# Patient Record
Sex: FEMALE | Race: WHITE | NOT HISPANIC OR LATINO | Employment: STUDENT | URBAN - METROPOLITAN AREA
[De-identification: names, ages, dates, MRNs, and addresses within clinical notes are randomized per-mention and may not be internally consistent; named-entity substitution may affect disease eponyms.]

---

## 2018-08-30 ENCOUNTER — TELEPHONE (OUTPATIENT)
Dept: OBSTETRICS AND GYNECOLOGY | Facility: CLINIC | Age: 23
End: 2018-08-30

## 2018-08-30 RX ORDER — NORETHINDRONE ACETATE AND ETHINYL ESTRADIOL 1MG-20(21)
1 KIT ORAL DAILY
Qty: 28 TABLET | Refills: 11 | Status: CANCELLED | OUTPATIENT
Start: 2018-08-30 | End: 2019-08-30

## 2018-08-30 NOTE — TELEPHONE ENCOUNTER
Khoi pt- states she just noticed a large lump on the left side of her labia minora. States it's sore, but is closed. Would like to be seen.  last seen on 8/23/16    Scheduled with Kus[facility] on 9/5

## 2018-08-30 NOTE — TELEPHONE ENCOUNTER
Khoi pt-- pt's mother calling, states that the pt would like to come in to be seen. States that she has a painful cyst on her vagina. Pt also would like a refill of her b.c. Pt's # [phone]

## 2018-08-30 NOTE — TELEPHONE ENCOUNTER
Khoi pt - pt was last seen on 8/23/16. Pt has a Bartholin's Cyst on the outside of her vagina and she would like to come in for an appt.

## 2018-09-03 ENCOUNTER — NURSE TRIAGE (OUTPATIENT)
Dept: ADMINISTRATIVE | Facility: CLINIC | Age: 23
End: 2018-09-03

## 2018-09-05 ENCOUNTER — OFFICE VISIT (OUTPATIENT)
Dept: OBSTETRICS AND GYNECOLOGY | Facility: CLINIC | Age: 23
End: 2018-09-05
Payer: COMMERCIAL

## 2018-09-05 VITALS
DIASTOLIC BLOOD PRESSURE: 70 MMHG | SYSTOLIC BLOOD PRESSURE: 110 MMHG | HEIGHT: 65 IN | BODY MASS INDEX: 24.68 KG/M2 | WEIGHT: 148.13 LBS

## 2018-09-05 DIAGNOSIS — N75.0 CYST OF LEFT BARTHOLIN'S GLAND: Primary | ICD-10-CM

## 2018-09-05 PROCEDURE — 56420 I&D BARTHOLINS GLAND ABSCESS: CPT | Mod: S$GLB,,, | Performed by: NURSE PRACTITIONER

## 2018-09-05 PROCEDURE — 87070 CULTURE OTHR SPECIMN AEROBIC: CPT

## 2018-09-05 PROCEDURE — 99215 OFFICE O/P EST HI 40 MIN: CPT | Mod: 25,S$GLB,, | Performed by: NURSE PRACTITIONER

## 2018-09-05 PROCEDURE — 87076 CULTURE ANAEROBE IDENT EACH: CPT

## 2018-09-05 PROCEDURE — 87075 CULTR BACTERIA EXCEPT BLOOD: CPT

## 2018-09-05 PROCEDURE — 3008F BODY MASS INDEX DOCD: CPT | Mod: CPTII,S$GLB,, | Performed by: NURSE PRACTITIONER

## 2018-09-05 PROCEDURE — 99999 PR PBB SHADOW E&M-EST. PATIENT-LVL III: CPT | Mod: PBBFAC,,, | Performed by: NURSE PRACTITIONER

## 2018-09-05 RX ORDER — LISDEXAMFETAMINE DIMESYLATE 20 MG/1
20 CAPSULE ORAL EVERY MORNING
Refills: 0 | COMMUNITY
Start: 2018-08-31 | End: 2020-11-10

## 2018-09-05 RX ORDER — SULFAMETHOXAZOLE AND TRIMETHOPRIM 800; 160 MG/1; MG/1
1 TABLET ORAL 2 TIMES DAILY
Qty: 14 TABLET | Refills: 0 | Status: SHIPPED | OUTPATIENT
Start: 2018-09-05 | End: 2018-09-12

## 2018-09-05 RX ORDER — IBUPROFEN 800 MG/1
800 TABLET ORAL EVERY 8 HOURS PRN
Qty: 20 TABLET | Refills: 0 | Status: SHIPPED | OUTPATIENT
Start: 2018-09-05 | End: 2018-09-18

## 2018-09-05 NOTE — PROGRESS NOTES
Chief Complaint: Problem:     Chief Complaint   Patient presents with    Bartholin's Cyst       Dr Westfall  no pap in computer        (Dr. Westfall patient)    Last Pap:  No result found Patient states she had pap done one year ago when she lived in [place], per Dr. John Diehl, and it was nml.      HPI:      Lorena Daniel is a 23 y.o.  who presents today for c/o possible Left Bartholin gland cyst that has been there for a little less than a week.    LMP: Patient's last menstrual period was 2018 (exact date).    Ms. Daniel is currently sexually active with a single male partner.   She declines STD screening today with her examination.      Past Medical History:   Diagnosis Date    ADHD (attention deficit hyperactivity disorder)     Depression      Past Surgical History:   Procedure Laterality Date    KNEE ARTHROSCOPY      STOMACH SURGERY  2016    gastric sleeve     Social History     Socioeconomic History    Marital status: Single     Spouse name: Not on file    Number of children: Not on file    Years of education: Not on file    Highest education level: Not on file   Social Needs    Financial resource strain: Not on file    Food insecurity - worry: Not on file    Food insecurity - inability: Not on file    Transportation needs - medical: Not on file    Transportation needs - non-medical: Not on file   Occupational History    Not on file   Tobacco Use    Smoking status: Current Some Day Smoker    Smokeless tobacco: Never Used   Substance and Sexual Activity    Alcohol use: Yes     Comment: socially    Drug use: No    Sexual activity: Yes     Birth control/protection: Condom   Other Topics Concern    Not on file   Social History Narrative    Not on file     Family History   Problem Relation Age of Onset    Breast cancer Neg Hx     Colon cancer Neg Hx     Ovarian cancer Neg Hx      OB History      Para Term  AB Living    0 0 0 0 0 0    SAB TAB  "Ectopic Multiple Live Births    0 0 0 0            ROS:     GENERAL: Denies unintentional weight gain or weight loss.    CARDIOVASCULAR: Denies palpitations or chest pain.   RESPIRATORY: Denies shortness of breath or dyspnea on exertion.  ABDOMEN: Denies abdominal pain, constipation, diarrhea, nausea, vomiting, change in appetite.  URINARY: Denies frequency, dysuria, hematuria.  NEUROLOGIC: Denies syncope or weakness.   VULVAR: REPORTS pain and lump to left labia majora near vaginal opening; no lesions and no itching.  VAGINAL: No relaxation, no itching, no abnormal bleeding and no lesions.    Physical Exam:      PHYSICAL EXAM:  /70   Ht 5' 5" (1.651 m)   Wt 67.2 kg (148 lb 2.4 oz)   LMP 08/11/2018 (Exact Date)   BMI 24.65 kg/m²   Body mass index is 24.65 kg/m².     APPEARANCE: Well nourished, well developed, in no acute distress.  PSYCH: Appropriate mood and affect.  CHEST: Normal respiratory effort.  VULVAR: Patient presents with c/o painful vulvar mass.  No fever.  She denies drainage from mass.  There is a 4 cm abscess of the left labium majus.  There is not surrounding cellulitis.  Normal hair distribution.  Adequate perineal body and normal urethral meatus.  Vagina moist and well-rugated.    =======================================  Bartholin Gland Cyst I&D Counseling:  The patient was informed of the risk of bleeding, pain and infection.  She was counseled on outpatient management, and she agrees to proceed with in-office incision and drainage.    Procedure Note: (Procedure performed per Dr. Tobin)  A time out was performed.  Bartholins gland prepped with betadine and the base of the abscess was injected with 6cc 1% lidocaine with epinephrine.  A scalpel was used to incise over pointing aspect of abscess with a #11 blade, and a small hemostat was used to widen the incision and allow for more adequate drainage.  Purulent material drained.  A Word catheter was placed and inflated.  The patient " tolerated the procedure well.  Cultures were taken.    Post I&D of Bartholin's gland cyst drainage Counseling:  The patient was counseled to manage post I&D pain with NSAID's.  She was advised to expect a bloody yellowish discharge for 24-48 hours.  If a word catheter was placed, the patient should expect it to possibly fall out sometime within the next 2 weeks.  She was advised to take warm sitz baths twice daily.  The patient was advised to avoid vaginal intercourse, douching, and tampons for at least 2 week.  She was counseled to call for evaluation in the event of heavy bleeding, fever greater than 101.0F, or worsening pain and redness at the I&D site.  Counseling lasted about 15 minutes, and all questions were answered.    Follow-up:  RTC for follow-up in 2 weeks.  Pending culture/biopsy results.  * Will call with results when finalized.    * Use of the [facility] Patient Portal discussed and encouraged during today's visit.     Assessment/Plan:     Cyst of left Bartholin's gland  -     Aerobic culture  -     Culture, Anaerobic        -     Incision & drainage of bartholin's gland cyst        -     Bactrim rx sent (x 7 days        -     Motrin 800 mg PO Q8h prn pain rx sent    Counseling:     Patient was counseled today on current ASCCP pap guidelines, the recommendation for yearly pelvic exams, healthy diet and exercise routines, annual mammograms and breast self awareness. She is to see her PCP for other health maintenance.

## 2018-09-08 LAB — BACTERIA SPEC AEROBE CULT: NORMAL

## 2018-09-11 LAB — BACTERIA SPEC ANAEROBE CULT: NORMAL

## 2018-09-12 ENCOUNTER — TELEPHONE (OUTPATIENT)
Dept: OBSTETRICS AND GYNECOLOGY | Facility: CLINIC | Age: 23
End: 2018-09-12

## 2018-09-12 NOTE — PROGRESS NOTES
Aayush Carrillo,   I tried calling you on the mobile number listed in your chart and it went straight to an automated voicemail, so that's why I didn't leave a message.  The number I have for your cell is [phone].  I was calling to tell you that the culture came back from your Bartholin's cyst, and I reviewed them with , and she agrees that the Bactrim (antibiotic) we gave you should clear up any infection.  I wanted to know how you are doing, and if you feel you are improving a little more each day.  So, if you have a moment to message me back or call me, please do.    ~ AMY Hopson

## 2018-09-14 ENCOUNTER — TELEPHONE (OUTPATIENT)
Dept: OBSTETRICS AND GYNECOLOGY | Facility: CLINIC | Age: 23
End: 2018-09-14

## 2018-09-18 ENCOUNTER — OFFICE VISIT (OUTPATIENT)
Dept: OBSTETRICS AND GYNECOLOGY | Facility: CLINIC | Age: 23
End: 2018-09-18
Attending: OBSTETRICS & GYNECOLOGY
Payer: COMMERCIAL

## 2018-09-18 VITALS
BODY MASS INDEX: 25.33 KG/M2 | WEIGHT: 152 LBS | DIASTOLIC BLOOD PRESSURE: 68 MMHG | HEIGHT: 65 IN | SYSTOLIC BLOOD PRESSURE: 110 MMHG

## 2018-09-18 DIAGNOSIS — N75.0 CYST OF LEFT BARTHOLIN'S GLAND: Primary | ICD-10-CM

## 2018-09-18 DIAGNOSIS — Z30.430 ENCOUNTER FOR INSERTION OF MIRENA IUD: ICD-10-CM

## 2018-09-18 DIAGNOSIS — Z11.3 SCREEN FOR STD (SEXUALLY TRANSMITTED DISEASE): ICD-10-CM

## 2018-09-18 PROCEDURE — 99999 PR PBB SHADOW E&M-EST. PATIENT-LVL III: CPT | Mod: PBBFAC,,, | Performed by: OBSTETRICS & GYNECOLOGY

## 2018-09-18 PROCEDURE — 3008F BODY MASS INDEX DOCD: CPT | Mod: CPTII,S$GLB,, | Performed by: OBSTETRICS & GYNECOLOGY

## 2018-09-18 PROCEDURE — 87491 CHLMYD TRACH DNA AMP PROBE: CPT

## 2018-09-18 PROCEDURE — 99213 OFFICE O/P EST LOW 20 MIN: CPT | Mod: S$GLB,,, | Performed by: OBSTETRICS & GYNECOLOGY

## 2018-09-18 RX ORDER — DESVENLAFAXINE SUCCINATE 50 MG/1
TABLET, EXTENDED RELEASE ORAL
COMMUNITY
Start: 2018-07-28 | End: 2019-01-08

## 2018-09-18 NOTE — PROGRESS NOTES
Subjective:       Patient ID: Lorena Daniel is a 23 y.o. female.    Chief Complaint:  Bartholin's Cyst (follow up )      History of Present Illness  - patient presents to f/u Bartholin's cyst. Reports that it has completely resolved; the catheter fell out a few days after it was placed. She completed her course of antibiotics.  - patient would like to start something for birth control. She is having regular periods and using condoms. She's concerned that she won't remember to take a daily pill. Patient would like know more about an IUD.    Past Medical History:   Diagnosis Date    ADHD (attention deficit hyperactivity disorder)     Depression        Past Surgical History:   Procedure Laterality Date    ARTHROSCOPY, KNEE Left 2014    [facility]    CHONDRECTOMY, KNEE, SEMILUNAR CARTILAGE Left 2014    [facility]    EXCISION, PLICA, KNEE, ARTHROSCOPIC Left 2014    [facility]    KNEE ARTHROSCOPY      RECONSTRUCTION, LIGAMENT Left 2014    [facility]    STOMACH SURGERY  2016    gastric sleeve         Current Outpatient Medications:     desvenlafaxine succinate (PRISTIQ) 50 MG Tb24, , Disp: , Rfl:     VYVANSE 20 mg capsule, Take 20 mg by mouth every morning., Disp: , Rfl: 0    levonorgestrel (MIRENA) 20 mcg/24 hr (5 years) IUD, 1 Intra Uterine Device by Intrauterine route once. for 1 dose, Disp: 1 each, Rfl: 0    Review of patient's allergies indicates:  No Known Allergies    GYN & OB History  Patient's last menstrual period was 2018.   Date of Last Pap: No result found    OB History    Para Term  AB Living   0 0 0 0 0 0   SAB TAB Ectopic Multiple Live Births   0 0 0 0               Social History     Socioeconomic History    Marital status: Single     Spouse name: Not on file    Number of children: Not on file    Years of education: Not on  "file    Highest education level: Not on file   Social Needs    Financial resource strain: Not on file    Food insecurity - worry: Not on file    Food insecurity - inability: Not on file    Transportation needs - medical: Not on file    Transportation needs - non-medical: Not on file   Occupational History    Not on file   Tobacco Use    Smoking status: Former Smoker    Smokeless tobacco: Never Used   Substance and Sexual Activity    Alcohol use: Yes     Comment: socially    Drug use: No    Sexual activity: Yes     Birth control/protection: Condom   Other Topics Concern    Not on file   Social History Narrative    Not on file       Family History   Problem Relation Age of Onset    Breast cancer Neg Hx     Colon cancer Neg Hx     Ovarian cancer Neg Hx        Review of Systems  Review of Systems   All other systems reviewed and are negative.       Objective:     Vitals:    09/18/18 1106   BP: 110/68   Weight: 68.9 kg (152 lb 0.1 oz)   Height: 5' 5" (1.651 m)       Physical Exam:   Constitutional: She appears well-developed and well-nourished. She is cooperative. No distress.             Abdominal: Soft. Normal appearance. There is no tenderness.     Genitourinary: Vagina normal and uterus normal. There is no rash, tenderness or lesion on the right labia. There is no rash, tenderness or lesion on the left labia. Cervix is normal. Right adnexum displays no mass, no tenderness and no fullness. Left adnexum displays no mass, no tenderness and no fullness.   Genitourinary Comments: No evidence of Bartholin's cyst. GC/CT culture done.               Neurological: She is alert.          Assessment/ Plan:     Orders Placed This Encounter    C. trachomatis/N. gonorrhoeae by AMP DNA [facility]; Cervix    levonorgestrel (MIRENA) 20 mcg/24 hr (5 years) IUD       Lorena was seen today for bartholin's cyst.    Diagnoses and all orders for this visit:    Cyst of left Bartholin's gland    Screen for STD (sexually " transmitted disease)  -     C. trachomatis/N. gonorrhoeae by AMP DNA [facility]    Encounter for insertion of mirena IUD  -     levonorgestrel (MIRENA) 20 mcg/24 hr (5 years) IUD; 1 Intra Uterine Device by Intrauterine route once. for 1 dose    - reviewed chart: patient didn't return for test of cure (chlamydia): culture done today  - reviewed risks/benefits of long acting contraception. Patient opts for Mirena. Will insert under u/s guidance. Gave pamphlet.    Follow-up in about 1 month (around 10/18/2018) for IUD insertion.

## 2018-09-20 LAB
C TRACH DNA SPEC QL NAA+PROBE: NOT DETECTED
N GONORRHOEA DNA SPEC QL NAA+PROBE: NOT DETECTED

## 2018-10-01 ENCOUNTER — TELEPHONE (OUTPATIENT)
Dept: PHARMACY | Facility: CLINIC | Age: 23
End: 2018-10-01

## 2018-10-02 NOTE — TELEPHONE ENCOUNTER
Spoke with pt and she would like Mirena delivered to [address] [place] [address]

## 2018-10-05 ENCOUNTER — TELEPHONE (OUTPATIENT)
Dept: OBSTETRICS AND GYNECOLOGY | Facility: CLINIC | Age: 23
End: 2018-10-05

## 2018-10-05 DIAGNOSIS — Z30.430 ENCOUNTER FOR INSERTION OF MIRENA IUD: Primary | ICD-10-CM

## 2018-10-23 ENCOUNTER — TELEPHONE (OUTPATIENT)
Dept: OBSTETRICS AND GYNECOLOGY | Facility: CLINIC | Age: 23
End: 2018-10-23

## 2018-10-30 ENCOUNTER — TELEPHONE (OUTPATIENT)
Dept: OBSTETRICS AND GYNECOLOGY | Facility: CLINIC | Age: 23
End: 2018-10-30

## 2018-10-30 NOTE — TELEPHONE ENCOUNTER
Dr. Westfall patient calling- forgot to call for Iud insertion  And  just started her period, please call pt back

## 2018-11-27 ENCOUNTER — PROCEDURE VISIT (OUTPATIENT)
Dept: OBSTETRICS AND GYNECOLOGY | Facility: CLINIC | Age: 23
End: 2018-11-27
Attending: OBSTETRICS & GYNECOLOGY
Payer: COMMERCIAL

## 2018-11-27 VITALS
WEIGHT: 154.19 LBS | SYSTOLIC BLOOD PRESSURE: 118 MMHG | DIASTOLIC BLOOD PRESSURE: 70 MMHG | BODY MASS INDEX: 25.69 KG/M2 | HEIGHT: 65 IN

## 2018-11-27 DIAGNOSIS — Z01.812 PRE-PROCEDURE LAB EXAM: ICD-10-CM

## 2018-11-27 DIAGNOSIS — Z30.430 ENCOUNTER FOR INSERTION OF MIRENA IUD: Primary | ICD-10-CM

## 2018-11-27 LAB
B-HCG UR QL: NEGATIVE
CTP QC/QA: YES

## 2018-11-27 PROCEDURE — 76857 US EXAM PELVIC LIMITED: CPT | Mod: S$GLB,,, | Performed by: OBSTETRICS & GYNECOLOGY

## 2018-11-27 PROCEDURE — 58300 INSERT INTRAUTERINE DEVICE: CPT | Mod: S$GLB,,, | Performed by: OBSTETRICS & GYNECOLOGY

## 2018-11-27 PROCEDURE — 81025 URINE PREGNANCY TEST: CPT | Mod: S$GLB,,, | Performed by: OBSTETRICS & GYNECOLOGY

## 2018-11-27 NOTE — PROCEDURES
Risks/benefits discussed and consents signed. Time out performed.    Speculum inserted. Cervix cleaned with Betadine. Under ultrasound guidance, uterus sounded to 6.5 cm. Mirena fitted to sound depth. Under ultrasound guidance, Mirena inserted without difficulty. Tolerated well. Strings trimmed to 3 -4 cm from external os. Excellent hemostasis noted. Normal Bimanual exam.    Ultrasound confirms proper placement of Mirena.    Patient will follow up in 4 weeks to assess Mirena strings.

## 2019-01-07 ENCOUNTER — TELEPHONE (OUTPATIENT)
Dept: OBSTETRICS AND GYNECOLOGY | Facility: CLINIC | Age: 24
End: 2019-01-07

## 2019-01-07 NOTE — TELEPHONE ENCOUNTER
Dr Westfall pt's mom Holly calling regarding the pt having really bad vaginal bleeding and cramps with a iud. Mom states that she needs appt after 3:00 and this is an emergency. States also there have been phone calls regarding this matter and no one can get the pt in.I notice appt for tomorrow 1-8-19 @ 2:00 is sched. Holly # [phone]

## 2019-01-07 NOTE — TELEPHONE ENCOUNTER
Spoke with Mother since pt is in class.  Pt has been bleeding and cramping since IUD insertion.  She's song and has gained 10 pounds.  Asking if she can get an appt after 3pm, someone told her we dont have appts after 3pm.  She has an appt tomorrow at 2:00, rescheduled to 3:45.

## 2019-01-08 ENCOUNTER — OFFICE VISIT (OUTPATIENT)
Dept: OBSTETRICS AND GYNECOLOGY | Facility: CLINIC | Age: 24
End: 2019-01-08
Attending: OBSTETRICS & GYNECOLOGY
Payer: COMMERCIAL

## 2019-01-08 VITALS
SYSTOLIC BLOOD PRESSURE: 110 MMHG | WEIGHT: 152.88 LBS | HEIGHT: 65 IN | DIASTOLIC BLOOD PRESSURE: 66 MMHG | BODY MASS INDEX: 25.47 KG/M2

## 2019-01-08 DIAGNOSIS — R35.0 URINARY FREQUENCY: ICD-10-CM

## 2019-01-08 DIAGNOSIS — Z30.431 IUD CHECK UP: Primary | ICD-10-CM

## 2019-01-08 LAB
BILIRUB SERPL-MCNC: ABNORMAL MG/DL
BLOOD URINE, POC: 250
COLOR, POC UA: ABNORMAL
GLUCOSE UR QL STRIP: ABNORMAL
KETONES UR QL STRIP: ABNORMAL
LEUKOCYTE ESTERASE URINE, POC: ABNORMAL
NITRITE, POC UA: ABNORMAL
PH, POC UA: 5
PROTEIN, POC: ABNORMAL
SPECIFIC GRAVITY, POC UA: 1.02
UROBILINOGEN, POC UA: ABNORMAL

## 2019-01-08 PROCEDURE — 99999 PR PBB SHADOW E&M-EST. PATIENT-LVL III: CPT | Mod: PBBFAC,,, | Performed by: OBSTETRICS & GYNECOLOGY

## 2019-01-08 PROCEDURE — 81002 POCT URINE DIPSTICK WITHOUT MICROSCOPE: ICD-10-PCS | Mod: S$GLB,,, | Performed by: OBSTETRICS & GYNECOLOGY

## 2019-01-08 PROCEDURE — 87086 URINE CULTURE/COLONY COUNT: CPT

## 2019-01-08 PROCEDURE — 99213 PR OFFICE/OUTPT VISIT, EST, LEVL III, 20-29 MIN: ICD-10-PCS | Mod: 25,S$GLB,, | Performed by: OBSTETRICS & GYNECOLOGY

## 2019-01-08 PROCEDURE — 99213 OFFICE O/P EST LOW 20 MIN: CPT | Mod: 25,S$GLB,, | Performed by: OBSTETRICS & GYNECOLOGY

## 2019-01-08 PROCEDURE — 87186 SC STD MICRODIL/AGAR DIL: CPT

## 2019-01-08 PROCEDURE — 87088 URINE BACTERIA CULTURE: CPT

## 2019-01-08 PROCEDURE — 99999 PR PBB SHADOW E&M-EST. PATIENT-LVL III: ICD-10-PCS | Mod: PBBFAC,,, | Performed by: OBSTETRICS & GYNECOLOGY

## 2019-01-08 PROCEDURE — 81002 URINALYSIS NONAUTO W/O SCOPE: CPT | Mod: S$GLB,,, | Performed by: OBSTETRICS & GYNECOLOGY

## 2019-01-08 PROCEDURE — 3008F PR BODY MASS INDEX (BMI) DOCUMENTED: ICD-10-PCS | Mod: CPTII,S$GLB,, | Performed by: OBSTETRICS & GYNECOLOGY

## 2019-01-08 PROCEDURE — 87077 CULTURE AEROBIC IDENTIFY: CPT

## 2019-01-08 PROCEDURE — 3008F BODY MASS INDEX DOCD: CPT | Mod: CPTII,S$GLB,, | Performed by: OBSTETRICS & GYNECOLOGY

## 2019-01-08 RX ORDER — ESTRADIOL 1 MG/1
1 TABLET ORAL DAILY
Qty: 7 TABLET | Refills: 0 | Status: SHIPPED | OUTPATIENT
Start: 2019-01-08 | End: 2020-11-10

## 2019-01-08 NOTE — PROGRESS NOTES
Subjective:       Patient ID: Lorena Daniel is a 23 y.o. female.    Chief Complaint:  IUD Check (cramping and still bleeding)      History of Present Illness  - patient presents for IUD check. Has had spotting and yellow discharge since insertion (not daily but fairly often). Reports a fishy vaginal odor that's new as well. Has had some cramping since insertion that is random and different than her usual period cramps (those are usually in her back; these were in back and front). Also has been experiencing urinary frequency.  - has felt song lately. Recently stopped Pristiq. Has f/u scheduled with her psychiatrist to discuss.    Past Medical History:   Diagnosis Date    ADHD (attention deficit hyperactivity disorder)     Depression        Past Surgical History:   Procedure Laterality Date    ARTHROSCOPY, KNEE Left 2014    [facility]    CHONDRECTOMY, KNEE, SEMILUNAR CARTILAGE Left 2014    [facility]    EXCISION, PLICA, KNEE, ARTHROSCOPIC Left 2014    [facility]    KNEE ARTHROSCOPY      RECONSTRUCTION, LIGAMENT Left 2014    [facility]    STOMACH SURGERY  2016    gastric sleeve         Current Outpatient Medications:     levonorgestrel (MIRENA) 20 mcg/24 hr (5 years) IUD, Insert 1 device by  intrauterine route once., Disp: 1 each, Rfl: 0    VYVANSE 20 mg capsule, Take 20 mg by mouth every morning., Disp: , Rfl: 0    estradiol (ESTRACE) 1 MG tablet, Take 1 tablet (1 mg total) by mouth once daily., Disp: 7 tablet, Rfl: 0    metroNIDAZOLE (NUVESSA) 1.3 % Gel, Place 1 application vaginally once. Bin#[id number] Pcn#CN Grp#ECNUVESSA ID#NUVESSA for 1 dose, Disp: 5 g, Rfl: 0    Review of patient's allergies indicates:  No Known Allergies    GYN & OB History  No LMP recorded. Patient has had an implant.   Date of Last Pap: No result found    OB History    Para  "Term  AB Living   0 0 0 0 0 0   SAB TAB Ectopic Multiple Live Births   0 0 0 0               Social History     Socioeconomic History    Marital status: Single     Spouse name: Not on file    Number of children: Not on file    Years of education: Not on file    Highest education level: Not on file   Social Needs    Financial resource strain: Not on file    Food insecurity - worry: Not on file    Food insecurity - inability: Not on file    Transportation needs - medical: Not on file    Transportation needs - non-medical: Not on file   Occupational History    Not on file   Tobacco Use    Smoking status: Former Smoker    Smokeless tobacco: Never Used   Substance and Sexual Activity    Alcohol use: Yes     Comment: socially    Drug use: No    Sexual activity: Yes     Birth control/protection: IUD     Comment: mirena inserted 2018   Other Topics Concern    Not on file   Social History Narrative    Not on file       Family History   Problem Relation Age of Onset    Breast cancer Neg Hx     Colon cancer Neg Hx     Ovarian cancer Neg Hx        Review of Systems  Review of Systems   All other systems reviewed and are negative.       Objective:     Vitals:    19 1610   BP: 110/66   Weight: 69.3 kg (152 lb 14.2 oz)   Height: 5' 5" (1.651 m)       Physical Exam:   Constitutional: She appears well-developed and well-nourished. She is cooperative. No distress.             Abdominal: Soft. Normal appearance. There is no tenderness.     Genitourinary: Uterus normal. There is no rash, tenderness or lesion on the right labia. There is no rash, tenderness or lesion on the left labia. Cervix is normal. Right adnexum displays no mass, no tenderness and no fullness. Left adnexum displays no mass, no tenderness and no fullness. Vaginal discharge found. Additional cervical findings: IUD strings visualized  Genitourinary Comments: Thin yellow discharge in vault.               Neurological: She is alert.   "        Assessment/ Plan:     Orders Placed This Encounter    Urine culture    POCT URINE DIPSTICK WITHOUT MICROSCOPE    estradiol (ESTRACE) 1 MG tablet    metroNIDAZOLE (NUVESSA) 1.3 % Gel       Lorena was seen today for iud check.    Diagnoses and all orders for this visit:    IUD check up    Urinary frequency  -     POCT URINE DIPSTICK WITHOUT MICROSCOPE  -     Urine culture    Other orders  -     estradiol (ESTRACE) 1 MG tablet; Take 1 tablet (1 mg total) by mouth once daily.  -     metroNIDAZOLE (NUVESSA) 1.3 % Gel; Place 1 application vaginally once. Bin#[id number] Pcn#CN Grp#ECNUVESSA ID#NUVESSA for 1 dose    - will give a one time dose of Nuvessa for presumptive bacterial vaginosis.  - will give seven days of estradiol to stabilize uterine lining.  - send urine for culture. Will wait to treat until resulted.  - patient will keep me posted.    Follow-up if symptoms worsen or fail to improve.

## 2019-01-11 ENCOUNTER — PATIENT MESSAGE (OUTPATIENT)
Dept: OBSTETRICS AND GYNECOLOGY | Facility: CLINIC | Age: 24
End: 2019-01-11

## 2019-01-11 LAB — BACTERIA UR CULT: NORMAL

## 2019-01-11 RX ORDER — NITROFURANTOIN 25; 75 MG/1; MG/1
100 CAPSULE ORAL 2 TIMES DAILY
Qty: 14 CAPSULE | Refills: 0 | Status: SHIPPED | OUTPATIENT
Start: 2019-01-11 | End: 2020-10-26

## 2019-01-11 NOTE — TELEPHONE ENCOUNTER
Please call patient to be sure she received my portal message that she has a UTI. I sent in Macrobid. Thanks.

## 2019-04-09 ENCOUNTER — TELEPHONE (OUTPATIENT)
Dept: OBSTETRICS AND GYNECOLOGY | Facility: CLINIC | Age: 24
End: 2019-04-09

## 2019-04-09 NOTE — TELEPHONE ENCOUNTER
Khoi pt - pt had an iud inserted on 11/27/18 and has had her period for 2 weeks. She would like to see if she should come in for an appt to check if the iud moved.

## 2019-07-18 NOTE — TELEPHONE ENCOUNTER
Reason for Disposition   [1] SEVERE pain AND [2] not improved 2 hours after pain medicine    Protocols used: ST VULVAR SYMPTOMS-A-AH    Mother calling regarding Pt having a cyst to vaginal area.  Pt rates pain 9/10.  Care advice given.  Mother wants earlier appt than 9/5/2018.  Will message MD and Staff.   4

## 2019-10-02 ENCOUNTER — TELEPHONE (OUTPATIENT)
Dept: OBSTETRICS AND GYNECOLOGY | Facility: CLINIC | Age: 24
End: 2019-10-02

## 2019-10-02 NOTE — TELEPHONE ENCOUNTER
Dr. Westfall pt called saying that she had a cyst drained but now there is a lump there. Pt said it does not hurt but is concerned. Pt also said that she would like a full panel of labs drawn because she thinks her hormones are messed. Please advise.

## 2019-10-02 NOTE — TELEPHONE ENCOUNTER
"Pt states she has a lump where a Bartholin cyst was drained.  Denies pain and erythema.  Advised if its not bothering her she can watch it as it may be scar tissue.  She wants labs drawn, says her hormones are out of whack and wants a general health panel.  Advised Dr. Westfall doesn't normally check "wellness labs" but she can discuss at annual.  Scheduled annual with Dr. Westfall 10/22.   "

## 2019-10-22 ENCOUNTER — OFFICE VISIT (OUTPATIENT)
Dept: OBSTETRICS AND GYNECOLOGY | Facility: CLINIC | Age: 24
End: 2019-10-22
Attending: OBSTETRICS & GYNECOLOGY
Payer: COMMERCIAL

## 2019-10-22 VITALS
HEIGHT: 65 IN | BODY MASS INDEX: 26.41 KG/M2 | WEIGHT: 158.5 LBS | SYSTOLIC BLOOD PRESSURE: 118 MMHG | DIASTOLIC BLOOD PRESSURE: 72 MMHG

## 2019-10-22 DIAGNOSIS — Z12.4 ENCOUNTER FOR SCREENING FOR CERVICAL CANCER: ICD-10-CM

## 2019-10-22 DIAGNOSIS — Z01.419 ENCOUNTER FOR GYNECOLOGICAL EXAMINATION: Primary | ICD-10-CM

## 2019-10-22 PROCEDURE — 88142 CYTOPATH C/V THIN LAYER: CPT

## 2019-10-22 PROCEDURE — 99999 PR PBB SHADOW E&M-EST. PATIENT-LVL III: ICD-10-PCS | Mod: PBBFAC,,, | Performed by: OBSTETRICS & GYNECOLOGY

## 2019-10-22 PROCEDURE — 99999 PR PBB SHADOW E&M-EST. PATIENT-LVL III: CPT | Mod: PBBFAC,,, | Performed by: OBSTETRICS & GYNECOLOGY

## 2019-10-22 PROCEDURE — 99395 PREV VISIT EST AGE 18-39: CPT | Mod: S$GLB,,, | Performed by: OBSTETRICS & GYNECOLOGY

## 2019-10-22 PROCEDURE — 99395 PR PREVENTIVE VISIT,EST,18-39: ICD-10-PCS | Mod: S$GLB,,, | Performed by: OBSTETRICS & GYNECOLOGY

## 2019-10-22 RX ORDER — VILAZODONE HYDROCHLORIDE 20 MG/1
TABLET ORAL
COMMUNITY
Start: 2019-10-17 | End: 2023-06-20

## 2019-10-22 RX ORDER — HYDROCORTISONE 25 MG/G
OINTMENT TOPICAL
COMMUNITY
Start: 2019-10-08 | End: 2020-10-26

## 2019-10-22 RX ORDER — NYSTATIN 100000 U/G
OINTMENT TOPICAL
Refills: 0 | COMMUNITY
Start: 2019-10-08 | End: 2020-10-26

## 2019-10-22 NOTE — PROGRESS NOTES
Subjective:       Patient ID: Lorena Daniel is a 24 y.o. female.    Chief Complaint:  Well Woman (last pap wnl per pt in [place] 2017, declines STD testing ) and Hot Flashes (c/o hot flashes, increased hunger, increase breast size)      History of Present Illness  - here for annual. Left Bartholin's cyst is still present but not bothersome. Has been feeling more hungry lately and has gained a few pounds. Has had some hot flashes. Is amenorrheic with Mirena. Declines cultures; no new partners.    Past Medical History:   Diagnosis Date    ADHD (attention deficit hyperactivity disorder)     Depression        Past Surgical History:   Procedure Laterality Date    KNEE ARTHROSCOPY      STOMACH SURGERY  2016    gastric sleeve         Current Outpatient Medications:     hydrocortisone 2.5 % ointment, , Disp: , Rfl:     nystatin (MYCOSTATIN) ointment, MIX WITH EQUAL PARTS HYDROCORTISONE AND APPLY TO AFFECTED AREA BID FOR 7 TO 14 DAYS, Disp: , Rfl: 0    VIIBRYD 20 mg Tab, , Disp: , Rfl:     estradiol (ESTRACE) 1 MG tablet, Take 1 tablet (1 mg total) by mouth once daily. (Patient not taking: Reported on 10/22/2019), Disp: 7 tablet, Rfl: 0    levonorgestrel (MIRENA) 20 mcg/24 hr (5 years) IUD, Insert 1 device by  intrauterine route once., Disp: 1 each, Rfl: 0    nitrofurantoin, macrocrystal-monohydrate, (MACROBID) 100 MG capsule, Take 1 capsule (100 mg total) by mouth 2 (two) times daily. (Patient not taking: Reported on 10/22/2019), Disp: 14 capsule, Rfl: 0    VYVANSE 20 mg capsule, Take 20 mg by mouth every morning., Disp: , Rfl: 0    Review of patient's allergies indicates:  No Known Allergies    GYN & OB History  Patient's last menstrual period was 10/15/2019 (approximate).   Date of Last Pap: No result found    OB History    Para Term  AB Living   0 0 0 0 0 0   SAB TAB Ectopic Multiple Live Births   0 0 0 0         Social History     Socioeconomic History    Marital status: Single  "    Spouse name: Not on file    Number of children: Not on file    Years of education: Not on file    Highest education level: Not on file   Occupational History    Not on file   Social Needs    Financial resource strain: Not on file    Food insecurity:     Worry: Not on file     Inability: Not on file    Transportation needs:     Medical: Not on file     Non-medical: Not on file   Tobacco Use    Smoking status: Former Smoker    Smokeless tobacco: Never Used   Substance and Sexual Activity    Alcohol use: Yes     Comment: socially    Drug use: No    Sexual activity: Yes     Birth control/protection: IUD     Comment: mirena inserted 11/2018   Lifestyle    Physical activity:     Days per week: Not on file     Minutes per session: Not on file    Stress: Not on file   Relationships    Social connections:     Talks on phone: Not on file     Gets together: Not on file     Attends [employer]an service: Not on file     Active member of club or organization: Not on file     Attends meetings of clubs or organizations: Not on file     Relationship status: Not on file   Other Topics Concern    Not on file   Social History Narrative    Not on file       Family History   Problem Relation Age of Onset    Breast cancer Neg Hx     Colon cancer Neg Hx     Ovarian cancer Neg Hx        Review of Systems  Review of Systems   Respiratory: Negative for shortness of breath.    Cardiovascular: Negative for chest pain and palpitations.   Gastrointestinal: Negative for blood in stool, nausea and vomiting.   Genitourinary:        - see HPI   Skin: Negative for rash and wound.   Allergic/Immunologic: Negative for immunocompromised state.   Neurological: Negative for dizziness and syncope.   Hematological: Negative for adenopathy.   Psychiatric/Behavioral: Negative for behavioral problems.        Objective:     Vitals:    10/22/19 1037   BP: 118/72   Weight: 71.9 kg (158 lb 8.2 oz)   Height: 5' 5" (1.651 m)       Physical Exam: "   Constitutional: She is oriented to person, place, and time. She appears well-developed and well-nourished.        Pulmonary/Chest: Right breast exhibits no mass, no nipple discharge, no skin change, no tenderness and no swelling. Left breast exhibits no mass, no nipple discharge, no skin change, no tenderness and no swelling. Breasts are symmetrical.        Abdominal: Soft. She exhibits no distension. There is no tenderness.     Genitourinary: Vagina normal and uterus normal. There is no tenderness or lesion on the right labia. There is no tenderness or lesion on the left labia. Cervix is normal. Right adnexum displays no mass, no tenderness and no fullness. Left adnexum displays no mass, no tenderness and no fullness. No vaginal discharge found. Additional cervical findings: pap smear done  Genitourinary Comments: 1 cm Bartholin's cyst on the left, not infected, discharge easily expressed.            Musculoskeletal: Moves all extremeties.       Neurological: She is alert and oriented to person, place, and time.     Psychiatric: She has a normal mood and affect.        Assessment/ Plan:     Orders Placed This Encounter    Liquid-based pap smear, screening       Lorena was seen today for well woman and hot flashes.    Diagnoses and all orders for this visit:    Encounter for gynecological examination    Encounter for screening for cervical cancer   -     Liquid-based pap smear, screening    - recommend warm compresses and sitz baths for left Bartholin's cyst. Advised patient to call me if becomes painful or larger.  - reassured that she will still cycle with Mirena even though she doesn't have periods, which can explain the cyclic hot flashes.  - recommend she see PCP for health maintenance.    -     Follow up in about 1 year (around 10/22/2020) for annual exam.

## 2020-10-26 ENCOUNTER — OFFICE VISIT (OUTPATIENT)
Dept: OBSTETRICS AND GYNECOLOGY | Facility: CLINIC | Age: 25
End: 2020-10-26
Attending: OBSTETRICS & GYNECOLOGY
Payer: COMMERCIAL

## 2020-10-26 VITALS
HEIGHT: 65 IN | SYSTOLIC BLOOD PRESSURE: 122 MMHG | DIASTOLIC BLOOD PRESSURE: 84 MMHG | BODY MASS INDEX: 28.93 KG/M2 | WEIGHT: 173.63 LBS

## 2020-10-26 DIAGNOSIS — R14.0 ABDOMINAL BLOATING: ICD-10-CM

## 2020-10-26 DIAGNOSIS — Z12.4 ENCOUNTER FOR SCREENING FOR CERVICAL CANCER: ICD-10-CM

## 2020-10-26 DIAGNOSIS — Z01.419 ENCOUNTER FOR GYNECOLOGICAL EXAMINATION: Primary | ICD-10-CM

## 2020-10-26 PROCEDURE — 99395 PREV VISIT EST AGE 18-39: CPT | Mod: S$GLB,,, | Performed by: OBSTETRICS & GYNECOLOGY

## 2020-10-26 PROCEDURE — 99999 PR PBB SHADOW E&M-EST. PATIENT-LVL III: ICD-10-PCS | Mod: PBBFAC,,, | Performed by: OBSTETRICS & GYNECOLOGY

## 2020-10-26 PROCEDURE — 99395 PR PREVENTIVE VISIT,EST,18-39: ICD-10-PCS | Mod: S$GLB,,, | Performed by: OBSTETRICS & GYNECOLOGY

## 2020-10-26 PROCEDURE — 88175 CYTOPATH C/V AUTO FLUID REDO: CPT

## 2020-10-26 PROCEDURE — 99999 PR PBB SHADOW E&M-EST. PATIENT-LVL III: CPT | Mod: PBBFAC,,, | Performed by: OBSTETRICS & GYNECOLOGY

## 2020-10-26 RX ORDER — PANTOPRAZOLE SODIUM 40 MG/1
TABLET, DELAYED RELEASE ORAL
COMMUNITY
Start: 2020-10-21

## 2020-10-26 NOTE — PROGRESS NOTES
"Subjective:       Patient ID: Lorena Daniel is a 25 y.o. female.    Chief Complaint:  Well Woman (last pap 10/22/2019 NE[facility]; c/o cramps around cycle time, problems with bloating; "feeling different" with intercourse; declines STD cx)      History of Present Illness  - here for annual. Has very little spotting monthly with Mirena. About 2 weeks ago, patient had cramping/bloating and some discomfort with intercourse. She has been having some GI issues and has an appointment scheduled with GI.     Past Medical History:   Diagnosis Date    ADHD (attention deficit hyperactivity disorder)     Depression        Past Surgical History:   Procedure Laterality Date    KNEE ARTHROSCOPY      STOMACH SURGERY  2016    gastric sleeve         Current Outpatient Medications:     pantoprazole (PROTONIX) 40 MG tablet, , Disp: , Rfl:     VIIBRYD 20 mg Tab, , Disp: , Rfl:     VYVANSE 20 mg capsule, Take 20 mg by mouth every morning., Disp: , Rfl: 0    estradiol (ESTRACE) 1 MG tablet, Take 1 tablet (1 mg total) by mouth once daily. (Patient not taking: Reported on 10/22/2019), Disp: 7 tablet, Rfl: 0    levonorgestrel (MIRENA) 20 mcg/24 hr (5 years) IUD, Insert 1 device by  intrauterine route once., Disp: 1 each, Rfl: 0    Review of patient's allergies indicates:  No Known Allergies    GYN & OB History  Patient's last menstrual period was 10/20/2020.   Date of Last Pap: 10/28/2019    OB History    Para Term  AB Living   0 0 0 0 0 0   SAB TAB Ectopic Multiple Live Births   0 0 0 0         Social History     Socioeconomic History    Marital status: Single     Spouse name: Not on file    Number of children: Not on file    Years of education: Not on file    Highest education level: Not on file   Occupational History    Not on file   Social Needs    Financial resource strain: Not on file    Food insecurity     Worry: Not on file     Inability: Not on file    Transportation needs     Medical: Not " "on file     Non-medical: Not on file   Tobacco Use    Smoking status: Former Smoker    Smokeless tobacco: Never Used   Substance and Sexual Activity    Alcohol use: Yes     Comment: socially    Drug use: No    Sexual activity: Yes     Birth control/protection: I.U.D.     Comment: mirena inserted 11/2018   Lifestyle    Physical activity     Days per week: Not on file     Minutes per session: Not on file    Stress: Not on file   Relationships    Social connections     Talks on phone: Not on file     Gets together: Not on file     Attends Faith service: Not on file     Active member of club or organization: Not on file     Attends meetings of clubs or organizations: Not on file     Relationship status: Not on file   Other Topics Concern    Not on file   Social History Narrative    Not on file       Family History   Problem Relation Age of Onset    Breast cancer Neg Hx     Colon cancer Neg Hx     Ovarian cancer Neg Hx        Review of Systems  Review of Systems   Respiratory: Negative for shortness of breath.    Cardiovascular: Negative for chest pain and palpitations.   Gastrointestinal: Negative for blood in stool, nausea and vomiting.   Genitourinary:        - see HPI   Skin: Negative for rash and wound.   Allergic/Immunologic: Negative for immunocompromised state.   Neurological: Negative for dizziness and syncope.   Hematological: Negative for adenopathy.   Psychiatric/Behavioral: Negative for behavioral problems.        Objective:     Vitals:    10/26/20 0930   BP: 122/84   Weight: 78.8 kg (173 lb 9.8 oz)   Height: 5' 5" (1.651 m)       Physical Exam:   Constitutional: She is oriented to person, place, and time. She appears well-developed and well-nourished.        Pulmonary/Chest: Right breast exhibits no mass, no nipple discharge, no skin change, no tenderness and no swelling. Left breast exhibits no mass, no nipple discharge, no skin change, no tenderness and no swelling. Breasts are " symmetrical.        Abdominal: Soft. She exhibits no distension. There is no abdominal tenderness.     Genitourinary:    Vagina and uterus normal.   There is no tenderness or lesion on the right labia. There is no tenderness or lesion on the left labia. Cervix is normal. Right adnexum displays no mass, no tenderness and no fullness. Left adnexum displays no mass, no tenderness and no fullness. Additional cervical findings: pap smear donenegative for vaginal discharge          Musculoskeletal: Moves all extremeties.       Neurological: She is alert and oriented to person, place, and time.     Psychiatric: She has a normal mood and affect.        Assessment/ Plan:     Orders Placed This Encounter    US Pelvis Comp with Transvag NON-OB (xpd    Liquid-Based Pap Smear, Screening       Lorena was seen today for well woman.    Diagnoses and all orders for this visit:    Encounter for gynecological examination    Encounter for screening for cervical cancer   -     Liquid-Based Pap Smear, Screening    Abdominal bloating  -     US Pelvis Comp with Transvag NON-OB (xpd; Future    - discussed how this could be due to ovulation/cyst formation or may be due to GI issues. Discussed scheduling pelvic u/s 2 weeks from now (when pain would recur if it's cyclic) vs expectant management. Patient would like u/s scheduled. She will keep GI appt.    Follow up in about 1 year (around 10/26/2021) for annual exam.

## 2020-11-10 ENCOUNTER — OFFICE VISIT (OUTPATIENT)
Dept: OBSTETRICS AND GYNECOLOGY | Facility: CLINIC | Age: 25
End: 2020-11-10
Attending: OBSTETRICS & GYNECOLOGY
Payer: COMMERCIAL

## 2020-11-10 VITALS
DIASTOLIC BLOOD PRESSURE: 72 MMHG | WEIGHT: 173.5 LBS | BODY MASS INDEX: 28.91 KG/M2 | SYSTOLIC BLOOD PRESSURE: 110 MMHG | HEIGHT: 65 IN

## 2020-11-10 DIAGNOSIS — R14.0 ABDOMINAL BLOATING: Primary | ICD-10-CM

## 2020-11-10 PROCEDURE — 99999 PR PBB SHADOW E&M-EST. PATIENT-LVL III: CPT | Mod: PBBFAC,,, | Performed by: OBSTETRICS & GYNECOLOGY

## 2020-11-10 PROCEDURE — 99213 OFFICE O/P EST LOW 20 MIN: CPT | Mod: S$GLB,,, | Performed by: OBSTETRICS & GYNECOLOGY

## 2020-11-10 PROCEDURE — 99213 PR OFFICE/OUTPT VISIT, EST, LEVL III, 20-29 MIN: ICD-10-PCS | Mod: S$GLB,,, | Performed by: OBSTETRICS & GYNECOLOGY

## 2020-11-10 PROCEDURE — 99999 PR PBB SHADOW E&M-EST. PATIENT-LVL III: ICD-10-PCS | Mod: PBBFAC,,, | Performed by: OBSTETRICS & GYNECOLOGY

## 2020-11-10 RX ORDER — LISDEXAMFETAMINE DIMESYLATE 10 MG/1
CAPSULE ORAL
COMMUNITY
Start: 2020-10-27 | End: 2023-06-20

## 2020-11-10 NOTE — PROGRESS NOTES
Subjective:       Patient ID: Lorena Daniel is a 25 y.o. female.    Chief Complaint:  Follow-up (F/U Abd. Pain - US today )      History of Present Illness  -patient presents with u/s and f/u due to abdominal bloating. She has an appointment with her GI next week. She has no new complaints today.    Past Medical History:   Diagnosis Date    ADHD (attention deficit hyperactivity disorder)     Contraception, device intrauterine 2018    Depression        Past Surgical History:   Procedure Laterality Date    KNEE ARTHROSCOPY      STOMACH SURGERY  2016    gastric sleeve         Current Outpatient Medications:     levonorgestrel (MIRENA) 20 mcg/24 hr (5 years) IUD, Insert 1 device by  intrauterine route once., Disp: 1 each, Rfl: 0    pantoprazole (PROTONIX) 40 MG tablet, , Disp: , Rfl:     VIIBRYD 20 mg Tab, , Disp: , Rfl:     VYVANSE 10 mg Cap, 1 CAP BY MOUTH EVERY AM, Disp: , Rfl:     Review of patient's allergies indicates:  No Known Allergies    GYN & OB History  Patient's last menstrual period was 10/20/2020.   Date of Last Pap: 10/27/2020    OB History    Para Term  AB Living   0 0 0 0 0 0   SAB TAB Ectopic Multiple Live Births   0 0 0 0     Obstetric Comments   Menarche ~ 13       Social History     Socioeconomic History    Marital status: Single     Spouse name: Not on file    Number of children: Not on file    Years of education: Not on file    Highest education level: Not on file   Occupational History    Not on file   Social Needs    Financial resource strain: Not on file    Food insecurity     Worry: Not on file     Inability: Not on file    Transportation needs     Medical: Not on file     Non-medical: Not on file   Tobacco Use    Smoking status: Former Smoker    Smokeless tobacco: Never Used   Substance and Sexual Activity    Alcohol use: Yes     Comment: social    Drug use: No    Sexual activity: Yes     Birth control/protection: I.U.D.      "Comment: Single:   Mirena  11-27-18   Lifestyle    Physical activity     Days per week: Not on file     Minutes per session: Not on file    Stress: Not on file   Relationships    Social connections     Talks on phone: Not on file     Gets together: Not on file     Attends Islam service: Not on file     Active member of club or organization: Not on file     Attends meetings of clubs or organizations: Not on file     Relationship status: Not on file   Other Topics Concern    Not on file   Social History Narrative    Not on file       Family History   Problem Relation Age of Onset    Breast cancer Neg Hx     Colon cancer Neg Hx     Ovarian cancer Neg Hx        Review of Systems  Review of Systems   All other systems reviewed and are negative.       Objective:     Vitals:    11/10/20 1454   BP: 110/72   Weight: 78.7 kg (173 lb 8 oz)   Height: 5' 5" (1.651 m)       Physical Exam:   Constitutional: She appears well-developed and well-nourished. She is cooperative. No distress.                           Neurological: She is alert.          Assessment/ Plan:          Lorena was seen today for follow-up.    Diagnoses and all orders for this visit:    Abdominal bloating    - reviewed u/s: no abnormalities, IUD in place.  - discussed keeping a food diary / journal to see if she can tell what she's eaten or done that may have contributed to the bloating. Also discussed that she may still cycle with Mirena in place even if she doesn't bleed. May be more bloated/ sensitive at different times than others.   - she'll keep me posted.    Follow up for annual exam.    "

## 2020-11-28 ENCOUNTER — CLINICAL SUPPORT (OUTPATIENT)
Dept: URGENT CARE | Facility: CLINIC | Age: 25
End: 2020-11-28
Payer: COMMERCIAL

## 2020-11-28 DIAGNOSIS — Z11.9 ENCOUNTER FOR SCREENING EXAMINATION FOR INFECTIOUS DISEASE: Primary | ICD-10-CM

## 2020-11-28 LAB
CTP QC/QA: YES
SARS-COV-2 RDRP RESP QL NAA+PROBE: NEGATIVE

## 2020-11-28 PROCEDURE — U0002 COVID-19 LAB TEST NON-CDC: HCPCS | Mod: QW,S$GLB,, | Performed by: FAMILY MEDICINE

## 2020-11-28 PROCEDURE — U0002: ICD-10-PCS | Mod: QW,S$GLB,, | Performed by: FAMILY MEDICINE

## 2020-12-01 LAB
FINAL PATHOLOGIC DIAGNOSIS: NORMAL
Lab: NORMAL

## 2020-12-29 ENCOUNTER — CLINICAL SUPPORT (OUTPATIENT)
Dept: URGENT CARE | Facility: CLINIC | Age: 25
End: 2020-12-29
Payer: COMMERCIAL

## 2020-12-29 DIAGNOSIS — Z11.9 SCREENING EXAMINATION FOR UNSPECIFIED INFECTIOUS DISEASE: Primary | ICD-10-CM

## 2020-12-29 LAB
CTP QC/QA: YES
SARS-COV-2 RDRP RESP QL NAA+PROBE: NEGATIVE

## 2020-12-29 PROCEDURE — 99211 OFF/OP EST MAY X REQ PHY/QHP: CPT | Mod: S$GLB,,, | Performed by: NURSE PRACTITIONER

## 2020-12-29 PROCEDURE — U0002 COVID-19 LAB TEST NON-CDC: HCPCS | Mod: QW,S$GLB,, | Performed by: NURSE PRACTITIONER

## 2020-12-29 PROCEDURE — 99211 PR OFFICE/OUTPT VISIT, EST, LEVL I: ICD-10-PCS | Mod: S$GLB,,, | Performed by: NURSE PRACTITIONER

## 2020-12-29 PROCEDURE — U0002: ICD-10-PCS | Mod: QW,S$GLB,, | Performed by: NURSE PRACTITIONER

## 2021-04-16 ENCOUNTER — PATIENT MESSAGE (OUTPATIENT)
Dept: RESEARCH | Facility: HOSPITAL | Age: 26
End: 2021-04-16

## 2021-11-04 ENCOUNTER — OFFICE VISIT (OUTPATIENT)
Dept: OBSTETRICS AND GYNECOLOGY | Facility: CLINIC | Age: 26
End: 2021-11-04
Attending: OBSTETRICS & GYNECOLOGY
Payer: COMMERCIAL

## 2021-11-04 VITALS
BODY MASS INDEX: 29.07 KG/M2 | SYSTOLIC BLOOD PRESSURE: 110 MMHG | HEIGHT: 65 IN | DIASTOLIC BLOOD PRESSURE: 66 MMHG | WEIGHT: 174.5 LBS

## 2021-11-04 DIAGNOSIS — Z01.419 ENCOUNTER FOR GYNECOLOGICAL EXAMINATION: Primary | ICD-10-CM

## 2021-11-04 PROCEDURE — 99999 PR PBB SHADOW E&M-EST. PATIENT-LVL III: CPT | Mod: PBBFAC,,, | Performed by: OBSTETRICS & GYNECOLOGY

## 2021-11-04 PROCEDURE — 99999 PR PBB SHADOW E&M-EST. PATIENT-LVL III: ICD-10-PCS | Mod: PBBFAC,,, | Performed by: OBSTETRICS & GYNECOLOGY

## 2021-11-04 PROCEDURE — 99395 PREV VISIT EST AGE 18-39: CPT | Mod: S$GLB,,, | Performed by: OBSTETRICS & GYNECOLOGY

## 2021-11-04 PROCEDURE — 88175 CYTOPATH C/V AUTO FLUID REDO: CPT | Performed by: OBSTETRICS & GYNECOLOGY

## 2021-11-04 PROCEDURE — 99395 PR PREVENTIVE VISIT,EST,18-39: ICD-10-PCS | Mod: S$GLB,,, | Performed by: OBSTETRICS & GYNECOLOGY

## 2021-11-11 LAB
CYTOLOGIST CVX/VAG CYTO: NORMAL
CYTOLOGY CVX/VAG DOC CYTO: NORMAL
CYTOLOGY CVX/VAG DOC THIN PREP: NORMAL
CYTOLOGY THINPREP PAP COMMENT: NORMAL
CYTOLOGY THINPREP PAP COMMENT: NORMAL
PAP NOTE: NORMAL
STAT OF ADQ CVX/VAG CYTO-IMP: NORMAL

## 2023-04-25 ENCOUNTER — TELEPHONE (OUTPATIENT)
Dept: OBSTETRICS AND GYNECOLOGY | Facility: CLINIC | Age: 28
End: 2023-04-25
Payer: COMMERCIAL

## 2023-05-16 ENCOUNTER — TELEPHONE (OUTPATIENT)
Dept: OBSTETRICS AND GYNECOLOGY | Facility: CLINIC | Age: 28
End: 2023-05-16
Payer: COMMERCIAL

## 2023-05-16 DIAGNOSIS — O99.810 ABNORMAL GLUCOSE TOLERANCE TEST (GTT) DURING PREGNANCY, ANTEPARTUM: Primary | ICD-10-CM

## 2023-05-18 ENCOUNTER — LAB VISIT (OUTPATIENT)
Dept: LAB | Facility: HOSPITAL | Age: 28
End: 2023-05-18
Attending: OBSTETRICS & GYNECOLOGY
Payer: COMMERCIAL

## 2023-05-18 DIAGNOSIS — O99.810 ABNORMAL GLUCOSE TOLERANCE TEST (GTT) DURING PREGNANCY, ANTEPARTUM: ICD-10-CM

## 2023-05-18 LAB
GLUCOSE SERPL-MCNC: 125 MG/DL
GLUCOSE SERPL-MCNC: 204 MG/DL
GLUCOSE SERPL-MCNC: 219 MG/DL
GLUCOSE SERPL-MCNC: 93 MG/DL (ref 70–110)

## 2023-05-18 PROCEDURE — 36415 COLL VENOUS BLD VENIPUNCTURE: CPT | Performed by: OBSTETRICS & GYNECOLOGY

## 2023-05-18 PROCEDURE — 82951 GLUCOSE TOLERANCE TEST (GTT): CPT | Performed by: OBSTETRICS & GYNECOLOGY

## 2023-05-19 ENCOUNTER — PATIENT MESSAGE (OUTPATIENT)
Dept: OBSTETRICS AND GYNECOLOGY | Facility: CLINIC | Age: 28
End: 2023-05-19
Payer: COMMERCIAL

## 2023-05-19 ENCOUNTER — TELEPHONE (OUTPATIENT)
Dept: OBSTETRICS AND GYNECOLOGY | Facility: CLINIC | Age: 28
End: 2023-05-19
Payer: COMMERCIAL

## 2023-05-19 DIAGNOSIS — O99.810 ABNORMAL GLUCOSE TOLERANCE TEST (GTT) DURING PREGNANCY, ANTEPARTUM: Primary | ICD-10-CM

## 2023-05-19 NOTE — TELEPHONE ENCOUNTER
Spoke with pt scheduled appts. Put in referral to diabetes education. Pt gave verbal understanding and agreed.

## 2023-05-19 NOTE — TELEPHONE ENCOUNTER
----- Message from Yulissa Westfall MD sent at 5/19/2023 10:48 AM CDT -----  Please call patient. Shes moving back to [place] and has an upcoming appointment with me. Her three hour glucose tolerance shows that she has gestational diabetes. And needs a referral to the diabetic nurse educator ASAP.

## 2023-05-29 ENCOUNTER — PATIENT MESSAGE (OUTPATIENT)
Dept: DIABETES | Facility: CLINIC | Age: 28
End: 2023-05-29
Payer: COMMERCIAL

## 2023-06-01 ENCOUNTER — CLINICAL SUPPORT (OUTPATIENT)
Dept: DIABETES | Facility: CLINIC | Age: 28
End: 2023-06-01
Payer: COMMERCIAL

## 2023-06-01 DIAGNOSIS — O24.410 DIET CONTROLLED GESTATIONAL DIABETES MELLITUS (GDM) IN SECOND TRIMESTER: Primary | ICD-10-CM

## 2023-06-01 DIAGNOSIS — O99.810 ABNORMAL GLUCOSE TOLERANCE TEST (GTT) DURING PREGNANCY, ANTEPARTUM: ICD-10-CM

## 2023-06-01 PROCEDURE — G0108 DIAB MANAGE TRN  PER INDIV: HCPCS | Mod: S$GLB,,, | Performed by: DIETITIAN, REGISTERED

## 2023-06-01 PROCEDURE — G0108 PR DIAB MANAGE TRN  PER INDIV: ICD-10-PCS | Mod: S$GLB,,, | Performed by: DIETITIAN, REGISTERED

## 2023-06-01 PROCEDURE — 99999 PR PBB SHADOW E&M-EST. PATIENT-LVL I: CPT | Mod: PBBFAC,,, | Performed by: DIETITIAN, REGISTERED

## 2023-06-01 PROCEDURE — 99999 PR PBB SHADOW E&M-EST. PATIENT-LVL I: ICD-10-PCS | Mod: PBBFAC,,, | Performed by: DIETITIAN, REGISTERED

## 2023-06-01 RX ORDER — INSULIN PUMP SYRINGE, 3 ML
EACH MISCELLANEOUS
Qty: 1 EACH | Refills: 0 | Status: ON HOLD | OUTPATIENT
Start: 2023-06-01 | End: 2023-08-24 | Stop reason: HOSPADM

## 2023-06-01 RX ORDER — LANCETS
1 EACH MISCELLANEOUS
Qty: 150 EACH | Refills: 4 | Status: SHIPPED | OUTPATIENT
Start: 2023-06-01 | End: 2023-07-06 | Stop reason: SDUPTHER

## 2023-06-01 NOTE — PROGRESS NOTES
Diabetes Care Specialist Progress Note  Author: Aleksandra Alexandre RD, CDE  Date: 6/1/2023    Program Intake  Reason for Diabetes Program Visit:: Initial Diabetes Assessment  Current diabetes risk level:: low  In the last 12 months, have you:: none  Permission to speak with others about care:: yes (Mom - present at visit)    No results found for: LABA1C, HGBA1C    Clinical    Problem Review  Reviewed Problem List with Patient: yes  Active comorbidities affecting diabetes self-care.: no  Reviewed health maintenance: no (see comments)    Clinical Assessment  Have you ever experienced hypoglycemia (low blood sugar)?: no  Have you ever experienced hyperglycemia (high blood sugar)?: no    Labs  Do you have regular lab work to monitor your medications?: Yes    Nutritional Status  Diet: Regular (3 meals daily plus afternoon snack; drinks water, iced coffee in the AM)  Change in appetite?: No  Dentation:: Intact  Recent Changes in Weight: Weight Gain (With pregnancy)  Current nutritional status an area of need that is impacting patient's ability to self-manage diabetes?: No    Additional Social History    Support  Does anyone support you with your diabetes care?: yes  Who supports you?: family member, self  Who takes you to your medical appointments?: self  Does the current support meet the patient's needs?: Yes  Is Support an area impacting ability to self-manage diabetes?: No    Access to Mass Media & Technology  Does the patient have access to any of the following devices or technologies?: Smart phone  Media or technology needs impacting ability to self-manage diabetes?: No    Cognitive/Behavioral Health  Alert and Oriented: Yes  Difficulty Thinking: No  Requires Prompting: No  Requires assistance for routine expression?: No  Cognitive or behavioral barriers impacting ability to self-manage diabetes?: No    Culture/Christianity  Culture or Anglican beliefs that may impact ability to access healthcare:  No    Communication  Language preference: English  Hearing Problems: No  Vision Problems: No  Communication needs impacting ability to self-manage diabetes?: No    Health Literacy  Preferred Learning Method: Face to Face  How often do you need to have someone help you read instructions, pamphlets, or written material from your doctor or pharmacy?: Never  Health literacy needs impacting ability to self-manage diabetes?: No      Diabetes Self-Management Skills Assessment    Diabetes Disease Process/Treatment Options  Patient/caregiver able to state what happens when someone has diabetes.: no  Patient/caregiver knows what type of diabetes they have.: yes  Diabetes Type : Gestational  Patient/caregiver able to identify at least three signs and symptoms of diabetes.: no  Patient able to identify at least three risk factors for diabetes.: no  Diabetes Disease Process/Treatment Options: Skills Assessment Completed: Yes  Assessment indicates:: Instruction Needed  Area of need?: Yes    Nutrition/Healthy Eating  Method of carbohydrate measurement:: no method  Patient can identify foods that impact blood sugar.: no (see comments)  Nutrition/Healthy Eating Skills Assessment Completed:: Yes  Assessment indicates:: Instruction Needed  Area of need?: Yes    Physical Activity/Exercise  Patient's daily activity level:: sedentary  Patient formally exercises outside of work.: no  Patient can identify forms of physical activity.: yes  Stated forms of physical activity:: any movement performed by muscles that uses energy  Patient can identify reasons why exercise/physical activity is important in diabetes management.: no  Physical Activity/Exercise Skills Assessment Completed: : Yes  Assessment indicates:: Instruction Needed  Area of need?: Yes    Medications  Medication Skills Assessment Completed:: No  Deffered due to:: Other (comment) (Not on DM meds)  Area of need?: No    Home Blood Glucose Monitoring  Patient states that blood  sugar is checked at home daily.: no  Reasons for not monitoring:: new diabetes diagnosis  Home Blood Glucose Monitoring Skills Assessment Completed: : Yes  Assessment indicates:: Instruction Needed  Area of need?: Yes    Acute Complications  Patient is able to identify types of acute complications: No  Acute Complications Skills Assessment Completed: : Yes  Assessment indicates:: Instruction Needed  Area of need?: Yes    Chronic Complications  Chronic Complications Skills Assessment Completed: : No  Deferred due to:: Other (comment) (GDM)  Area of need?: No    Psychosocial/Coping  Patient can identify ways of coping with chronic disease.: yes  Patient-stated ways of coping with chronic disease:: support from loved ones  Psychosocial/Coping Skills Assessment Completed: : Yes  Assessment indicates:: Adequate understanding  Area of need?: No    Assessment Summary and Plan    Based on today's diabetes care assessment, the following areas of need were identified:      Social 6/1/2023   Support No   Access to [employer] No   Cognitive/Behavioral Health No   Culture/Hinduism No   Communication No   Health Literacy No        Clinical 6/1/2023   Nutritional Status No        Diabetes Self-Management Skills 6/1/2023   Diabetes Disease Process/Treatment Options Yes - reviewed diabetes disease process and treatment options   Nutrition/Healthy Eating Yes - reviewed CHO counting, label reading and addt'l resources to assist w/ CHO counting; plate method reviewed   Physical Activity/Exercise Yes - reviewed goals and benefits   Medication No   Home Blood Glucose Monitoring Yes - reviewed SMBG schedule and BG goals; Rx request sent to provider to meter/supplies   Acute Complications Yes - reviewed potential complications of uncontrolled GDM   Chronic Complications No   Psychosocial/Coping No          Today's interventions were provided through individual discussion, instruction, and written materials were provided.      Patient  verbalized understanding of instruction and written materials.  Pt was able to return back demonstration of instructions today. Patient understood key points, needs reinforcement and further instruction.     Diabetes Self-Management Care Plan:    Today's Diabetes Self-Management Care Plan was developed with Lorena's input. Lorena has agreed to work toward the following goal(s) to improve his/her overall diabetes control.      Care Plan: Diabetes Management   Updates made since 5/2/2023 12:00 AM        Problem: Blood Glucose Self-Monitoring         Goal: Patient agrees to check and record blood sugars 4 times per day.    Start Date: 6/1/2023   Expected End Date: 8/1/2023   Priority: High   Barriers: No Barriers Identified       Task: Reviewed the importance of self-monitoring blood glucose and keeping logs. Completed 6/1/2023         Follow Up Plan     Follow up in about 4 weeks (around 6/29/2023).    Today's care plan and follow up schedule was discussed with patient.  Lorena verbalized understanding of the care plan, goals, and agrees to follow up plan.        The patient was encouraged to communicate with his/her health care provider/physician and care team regarding his/her condition(s) and treatment.  I provided the patient with my contact information today and encouraged to contact me via phone or [facility] Patient Portal as needed.     Length of Visit   Total Time: 60 Minutes

## 2023-06-20 ENCOUNTER — INITIAL PRENATAL (OUTPATIENT)
Dept: OBSTETRICS AND GYNECOLOGY | Facility: CLINIC | Age: 28
End: 2023-06-20
Attending: OBSTETRICS & GYNECOLOGY
Payer: COMMERCIAL

## 2023-06-20 ENCOUNTER — PATIENT MESSAGE (OUTPATIENT)
Dept: OBSTETRICS AND GYNECOLOGY | Facility: CLINIC | Age: 28
End: 2023-06-20
Payer: COMMERCIAL

## 2023-06-20 ENCOUNTER — CLINICAL SUPPORT (OUTPATIENT)
Dept: OBSTETRICS AND GYNECOLOGY | Facility: CLINIC | Age: 28
End: 2023-06-20
Payer: COMMERCIAL

## 2023-06-20 VITALS
DIASTOLIC BLOOD PRESSURE: 72 MMHG | BODY MASS INDEX: 33.31 KG/M2 | WEIGHT: 200.19 LBS | SYSTOLIC BLOOD PRESSURE: 118 MMHG

## 2023-06-20 DIAGNOSIS — O24.410 DIET CONTROLLED GESTATIONAL DIABETES MELLITUS (GDM) IN THIRD TRIMESTER: Primary | ICD-10-CM

## 2023-06-20 DIAGNOSIS — Z3A.29 29 WEEKS GESTATION OF PREGNANCY: ICD-10-CM

## 2023-06-20 DIAGNOSIS — Z23 NEED FOR TDAP VACCINATION: Primary | ICD-10-CM

## 2023-06-20 PROCEDURE — 90471 TDAP VACCINE GREATER THAN OR EQUAL TO 7YO IM: ICD-10-PCS | Mod: S$GLB,,, | Performed by: OBSTETRICS & GYNECOLOGY

## 2023-06-20 PROCEDURE — 90715 TDAP VACCINE GREATER THAN OR EQUAL TO 7YO IM: ICD-10-PCS | Mod: S$GLB,,, | Performed by: OBSTETRICS & GYNECOLOGY

## 2023-06-20 PROCEDURE — 99999 PR PBB SHADOW E&M-EST. PATIENT-LVL I: ICD-10-PCS | Mod: PBBFAC,,,

## 2023-06-20 PROCEDURE — 0502F SUBSEQUENT PRENATAL CARE: CPT | Mod: CPTII,S$GLB,, | Performed by: OBSTETRICS & GYNECOLOGY

## 2023-06-20 PROCEDURE — 87591 N.GONORRHOEAE DNA AMP PROB: CPT | Performed by: OBSTETRICS & GYNECOLOGY

## 2023-06-20 PROCEDURE — 99999 PR PBB SHADOW E&M-EST. PATIENT-LVL I: CPT | Mod: PBBFAC,,,

## 2023-06-20 PROCEDURE — 90471 IMMUNIZATION ADMIN: CPT | Mod: S$GLB,,, | Performed by: OBSTETRICS & GYNECOLOGY

## 2023-06-20 PROCEDURE — 99999 PR PBB SHADOW E&M-EST. PATIENT-LVL III: ICD-10-PCS | Mod: PBBFAC,,, | Performed by: OBSTETRICS & GYNECOLOGY

## 2023-06-20 PROCEDURE — 90715 TDAP VACCINE 7 YRS/> IM: CPT | Mod: S$GLB,,, | Performed by: OBSTETRICS & GYNECOLOGY

## 2023-06-20 PROCEDURE — 0502F PR SUBSEQUENT PRENATAL CARE: ICD-10-PCS | Mod: CPTII,S$GLB,, | Performed by: OBSTETRICS & GYNECOLOGY

## 2023-06-20 PROCEDURE — 87086 URINE CULTURE/COLONY COUNT: CPT | Performed by: OBSTETRICS & GYNECOLOGY

## 2023-06-20 PROCEDURE — 99999 PR PBB SHADOW E&M-EST. PATIENT-LVL III: CPT | Mod: PBBFAC,,, | Performed by: OBSTETRICS & GYNECOLOGY

## 2023-06-20 RX ORDER — FOLIC ACID 0.4 MG
400 TABLET ORAL DAILY
Status: ON HOLD | COMMUNITY
End: 2023-08-24 | Stop reason: HOSPADM

## 2023-06-20 RX ORDER — VENLAFAXINE HYDROCHLORIDE 75 MG/1
75 CAPSULE, EXTENDED RELEASE ORAL DAILY
COMMUNITY
End: 2023-09-20

## 2023-06-20 NOTE — PROGRESS NOTES
No complaints. Baby moves well! Reviewed FMC. Patient reports only one PP blood sugar was 160. The rest have been in range. Patient will send me portal message with numbers. Tdap given. Reviewed prenatal packet.

## 2023-06-20 NOTE — PROGRESS NOTES
After obtaining consent, and per orders of Dr. Westfall, injection of TDAP given by Evelyn Lee. Patient instructed to remain in clinic for 20 minutes afterwards, and to report any adverse reaction to me immediately.

## 2023-06-21 LAB
BACTERIA UR CULT: NORMAL
BACTERIA UR CULT: NORMAL
C TRACH DNA SPEC QL NAA+PROBE: NOT DETECTED
N GONORRHOEA DNA SPEC QL NAA+PROBE: NOT DETECTED

## 2023-06-22 ENCOUNTER — PATIENT MESSAGE (OUTPATIENT)
Dept: OTHER | Facility: OTHER | Age: 28
End: 2023-06-22
Payer: COMMERCIAL

## 2023-07-06 ENCOUNTER — ROUTINE PRENATAL (OUTPATIENT)
Dept: OBSTETRICS AND GYNECOLOGY | Facility: CLINIC | Age: 28
End: 2023-07-06
Payer: COMMERCIAL

## 2023-07-06 ENCOUNTER — PATIENT MESSAGE (OUTPATIENT)
Dept: OTHER | Facility: OTHER | Age: 28
End: 2023-07-06
Payer: COMMERCIAL

## 2023-07-06 VITALS
DIASTOLIC BLOOD PRESSURE: 76 MMHG | BODY MASS INDEX: 33.64 KG/M2 | WEIGHT: 202.19 LBS | SYSTOLIC BLOOD PRESSURE: 111 MMHG

## 2023-07-06 DIAGNOSIS — O24.410 DIET CONTROLLED GESTATIONAL DIABETES MELLITUS (GDM) IN SECOND TRIMESTER: ICD-10-CM

## 2023-07-06 DIAGNOSIS — Z3A.32 32 WEEKS GESTATION OF PREGNANCY: ICD-10-CM

## 2023-07-06 DIAGNOSIS — O24.410 DIET CONTROLLED GESTATIONAL DIABETES MELLITUS (GDM) IN THIRD TRIMESTER: Primary | ICD-10-CM

## 2023-07-06 PROCEDURE — 0502F SUBSEQUENT PRENATAL CARE: CPT | Mod: CPTII,S$GLB,, | Performed by: OBSTETRICS & GYNECOLOGY

## 2023-07-06 PROCEDURE — 99999 PR PBB SHADOW E&M-EST. PATIENT-LVL III: ICD-10-PCS | Mod: PBBFAC,,, | Performed by: OBSTETRICS & GYNECOLOGY

## 2023-07-06 PROCEDURE — 99999 PR PBB SHADOW E&M-EST. PATIENT-LVL III: CPT | Mod: PBBFAC,,, | Performed by: OBSTETRICS & GYNECOLOGY

## 2023-07-06 PROCEDURE — 0502F PR SUBSEQUENT PRENATAL CARE: ICD-10-PCS | Mod: CPTII,S$GLB,, | Performed by: OBSTETRICS & GYNECOLOGY

## 2023-07-06 RX ORDER — LANCETS
1 EACH MISCELLANEOUS
Qty: 150 EACH | Refills: 4 | Status: SHIPPED | OUTPATIENT
Start: 2023-07-06 | End: 2023-07-20 | Stop reason: SDUPTHER

## 2023-07-06 NOTE — PROGRESS NOTES
No complaints. Reviewed FMC. Reviewed BS log. Fasting ranges from 90 - 97. Will watch closely. Have low threshold to add metformin at night. Schedule u/s for growth in 2 weeks.

## 2023-07-09 DIAGNOSIS — O24.410 DIET CONTROLLED GESTATIONAL DIABETES MELLITUS (GDM) IN SECOND TRIMESTER: ICD-10-CM

## 2023-07-20 ENCOUNTER — ROUTINE PRENATAL (OUTPATIENT)
Dept: OBSTETRICS AND GYNECOLOGY | Facility: CLINIC | Age: 28
End: 2023-07-20
Payer: COMMERCIAL

## 2023-07-20 ENCOUNTER — PROCEDURE VISIT (OUTPATIENT)
Dept: OBSTETRICS AND GYNECOLOGY | Facility: CLINIC | Age: 28
End: 2023-07-20
Attending: OBSTETRICS & GYNECOLOGY
Payer: COMMERCIAL

## 2023-07-20 VITALS
BODY MASS INDEX: 33.68 KG/M2 | DIASTOLIC BLOOD PRESSURE: 72 MMHG | WEIGHT: 202.38 LBS | SYSTOLIC BLOOD PRESSURE: 128 MMHG

## 2023-07-20 DIAGNOSIS — O24.410 DIET CONTROLLED GESTATIONAL DIABETES MELLITUS (GDM) IN THIRD TRIMESTER: ICD-10-CM

## 2023-07-20 DIAGNOSIS — Z3A.34 34 WEEKS GESTATION OF PREGNANCY: Primary | ICD-10-CM

## 2023-07-20 DIAGNOSIS — O24.415 GESTATIONAL DIABETES MELLITUS (GDM) IN THIRD TRIMESTER CONTROLLED ON ORAL HYPOGLYCEMIC DRUG: ICD-10-CM

## 2023-07-20 PROCEDURE — 76816 US OB/GYN EXTENDED PROCEDURE (VIEWPOINT): ICD-10-PCS | Mod: S$GLB,,, | Performed by: OBSTETRICS & GYNECOLOGY

## 2023-07-20 PROCEDURE — 0502F PR SUBSEQUENT PRENATAL CARE: ICD-10-PCS | Mod: CPTII,S$GLB,, | Performed by: NURSE PRACTITIONER

## 2023-07-20 PROCEDURE — 99999 PR PBB SHADOW E&M-EST. PATIENT-LVL III: ICD-10-PCS | Mod: PBBFAC,,, | Performed by: NURSE PRACTITIONER

## 2023-07-20 PROCEDURE — 99999 PR PBB SHADOW E&M-EST. PATIENT-LVL III: CPT | Mod: PBBFAC,,, | Performed by: NURSE PRACTITIONER

## 2023-07-20 PROCEDURE — 0502F SUBSEQUENT PRENATAL CARE: CPT | Mod: CPTII,S$GLB,, | Performed by: NURSE PRACTITIONER

## 2023-07-20 PROCEDURE — 76816 OB US FOLLOW-UP PER FETUS: CPT | Mod: S$GLB,,, | Performed by: OBSTETRICS & GYNECOLOGY

## 2023-07-20 RX ORDER — LANCETS
1 EACH MISCELLANEOUS
Qty: 150 EACH | Refills: 4 | Status: SHIPPED | OUTPATIENT
Start: 2023-07-20 | End: 2023-07-20

## 2023-07-20 RX ORDER — METFORMIN HYDROCHLORIDE 500 MG/1
500 TABLET ORAL NIGHTLY
Qty: 30 TABLET | Refills: 0 | Status: SHIPPED | OUTPATIENT
Start: 2023-07-20 | End: 2023-07-22 | Stop reason: SDUPTHER

## 2023-07-20 RX ORDER — BLOOD-GLUCOSE METER
EACH MISCELLANEOUS
Status: ON HOLD | COMMUNITY
Start: 2023-06-01 | End: 2023-08-24 | Stop reason: HOSPADM

## 2023-07-20 RX ORDER — VENLAFAXINE 75 MG/1
75 TABLET ORAL
COMMUNITY
Start: 2023-07-12 | End: 2023-10-12

## 2023-07-21 ENCOUNTER — PATIENT MESSAGE (OUTPATIENT)
Dept: OBSTETRICS AND GYNECOLOGY | Facility: CLINIC | Age: 28
End: 2023-07-21
Payer: COMMERCIAL

## 2023-07-21 RX ORDER — METFORMIN HYDROCHLORIDE 500 MG/1
500 TABLET ORAL 2 TIMES DAILY WITH MEALS
Qty: 180 TABLET | Refills: 3 | OUTPATIENT
Start: 2023-07-21 | End: 2024-07-20

## 2023-07-22 ENCOUNTER — PATIENT MESSAGE (OUTPATIENT)
Dept: OBSTETRICS AND GYNECOLOGY | Facility: CLINIC | Age: 28
End: 2023-07-22
Payer: COMMERCIAL

## 2023-07-22 DIAGNOSIS — O24.415 GESTATIONAL DIABETES MELLITUS (GDM) IN THIRD TRIMESTER CONTROLLED ON ORAL HYPOGLYCEMIC DRUG: ICD-10-CM

## 2023-07-22 RX ORDER — METFORMIN HYDROCHLORIDE 500 MG/1
500 TABLET ORAL NIGHTLY
Qty: 30 TABLET | Refills: 0 | Status: SHIPPED | OUTPATIENT
Start: 2023-07-22 | End: 2023-08-07 | Stop reason: SDUPTHER

## 2023-07-22 NOTE — PROGRESS NOTES
Presents at 34w0d for LUCIA. Growth scan is WNL. Fasting BG 98, PP . Will begin Metformin 500 mg QHS. Will assist to schedule for PNT, discussed timing of delivery. FU in 2 weeks for LUCIA.

## 2023-07-27 ENCOUNTER — PATIENT MESSAGE (OUTPATIENT)
Dept: OBSTETRICS AND GYNECOLOGY | Facility: CLINIC | Age: 28
End: 2023-07-27
Payer: COMMERCIAL

## 2023-07-27 ENCOUNTER — PATIENT MESSAGE (OUTPATIENT)
Dept: OTHER | Facility: OTHER | Age: 28
End: 2023-07-27
Payer: COMMERCIAL

## 2023-07-27 NOTE — TELEPHONE ENCOUNTER
Spoke with patient. Asked her to send me a picture of her BS log so I can see the pattern. Will call her once I receive it.

## 2023-07-31 ENCOUNTER — PATIENT MESSAGE (OUTPATIENT)
Dept: OBSTETRICS AND GYNECOLOGY | Facility: CLINIC | Age: 28
End: 2023-07-31
Payer: COMMERCIAL

## 2023-08-03 ENCOUNTER — LAB VISIT (OUTPATIENT)
Dept: LAB | Facility: HOSPITAL | Age: 28
End: 2023-08-03
Attending: OBSTETRICS & GYNECOLOGY
Payer: COMMERCIAL

## 2023-08-03 ENCOUNTER — ROUTINE PRENATAL (OUTPATIENT)
Dept: OBSTETRICS AND GYNECOLOGY | Facility: CLINIC | Age: 28
End: 2023-08-03
Payer: COMMERCIAL

## 2023-08-03 ENCOUNTER — PROCEDURE VISIT (OUTPATIENT)
Dept: OBSTETRICS AND GYNECOLOGY | Facility: CLINIC | Age: 28
End: 2023-08-03
Payer: COMMERCIAL

## 2023-08-03 VITALS — SYSTOLIC BLOOD PRESSURE: 120 MMHG | BODY MASS INDEX: 34.19 KG/M2 | WEIGHT: 205.5 LBS | DIASTOLIC BLOOD PRESSURE: 88 MMHG

## 2023-08-03 DIAGNOSIS — Z3A.36 36 WEEKS GESTATION OF PREGNANCY: ICD-10-CM

## 2023-08-03 DIAGNOSIS — O24.415 GESTATIONAL DIABETES MELLITUS (GDM) IN THIRD TRIMESTER CONTROLLED ON ORAL HYPOGLYCEMIC DRUG: Primary | ICD-10-CM

## 2023-08-03 DIAGNOSIS — O16.3 ELEVATED BLOOD PRESSURE AFFECTING PREGNANCY IN THIRD TRIMESTER, ANTEPARTUM: ICD-10-CM

## 2023-08-03 DIAGNOSIS — O24.415 GESTATIONAL DIABETES MELLITUS (GDM) IN THIRD TRIMESTER CONTROLLED ON ORAL HYPOGLYCEMIC DRUG: ICD-10-CM

## 2023-08-03 LAB
ALBUMIN SERPL BCP-MCNC: 2.7 G/DL (ref 3.5–5.2)
ALP SERPL-CCNC: 138 U/L (ref 55–135)
ALT SERPL W/O P-5'-P-CCNC: 6 U/L (ref 10–44)
ANION GAP SERPL CALC-SCNC: 11 MMOL/L (ref 8–16)
AST SERPL-CCNC: 11 U/L (ref 10–40)
BASOPHILS # BLD AUTO: 0.03 K/UL (ref 0–0.2)
BASOPHILS NFR BLD: 0.4 % (ref 0–1.9)
BILIRUB SERPL-MCNC: 0.3 MG/DL (ref 0.1–1)
BUN SERPL-MCNC: 7 MG/DL (ref 6–20)
CALCIUM SERPL-MCNC: 9.4 MG/DL (ref 8.7–10.5)
CHLORIDE SERPL-SCNC: 105 MMOL/L (ref 95–110)
CO2 SERPL-SCNC: 19 MMOL/L (ref 23–29)
CREAT SERPL-MCNC: 0.6 MG/DL (ref 0.5–1.4)
CREAT UR-MCNC: 81 MG/DL (ref 15–325)
DIFFERENTIAL METHOD: ABNORMAL
EOSINOPHIL # BLD AUTO: 0 K/UL (ref 0–0.5)
EOSINOPHIL NFR BLD: 0.3 % (ref 0–8)
ERYTHROCYTE [DISTWIDTH] IN BLOOD BY AUTOMATED COUNT: 15.6 % (ref 11.5–14.5)
EST. GFR  (NO RACE VARIABLE): >60 ML/MIN/1.73 M^2
GLUCOSE SERPL-MCNC: 87 MG/DL (ref 70–110)
HCT VFR BLD AUTO: 31.8 % (ref 37–48.5)
HGB BLD-MCNC: 9.7 G/DL (ref 12–16)
HIV 1+2 AB+HIV1 P24 AG SERPL QL IA: NORMAL
IMM GRANULOCYTES # BLD AUTO: 0.08 K/UL (ref 0–0.04)
IMM GRANULOCYTES NFR BLD AUTO: 1.1 % (ref 0–0.5)
LYMPHOCYTES # BLD AUTO: 1.4 K/UL (ref 1–4.8)
LYMPHOCYTES NFR BLD: 18.9 % (ref 18–48)
MCH RBC QN AUTO: 24.5 PG (ref 27–31)
MCHC RBC AUTO-ENTMCNC: 30.5 G/DL (ref 32–36)
MCV RBC AUTO: 80 FL (ref 82–98)
MONOCYTES # BLD AUTO: 0.6 K/UL (ref 0.3–1)
MONOCYTES NFR BLD: 8.1 % (ref 4–15)
NEUTROPHILS # BLD AUTO: 5.4 K/UL (ref 1.8–7.7)
NEUTROPHILS NFR BLD: 71.2 % (ref 38–73)
NRBC BLD-RTO: 0 /100 WBC
PLATELET # BLD AUTO: 286 K/UL (ref 150–450)
PMV BLD AUTO: 11.1 FL (ref 9.2–12.9)
POTASSIUM SERPL-SCNC: 4.5 MMOL/L (ref 3.5–5.1)
PROT SERPL-MCNC: 6.6 G/DL (ref 6–8.4)
PROT UR-MCNC: 12 MG/DL (ref 0–15)
PROT/CREAT UR: 0.15 MG/G{CREAT} (ref 0–0.2)
RBC # BLD AUTO: 3.96 M/UL (ref 4–5.4)
SODIUM SERPL-SCNC: 135 MMOL/L (ref 136–145)
WBC # BLD AUTO: 7.52 K/UL (ref 3.9–12.7)

## 2023-08-03 PROCEDURE — 0502F PR SUBSEQUENT PRENATAL CARE: ICD-10-PCS | Mod: CPTII,S$GLB,, | Performed by: OBSTETRICS & GYNECOLOGY

## 2023-08-03 PROCEDURE — 76819 US OB/GYN EXTENDED PROCEDURE (VIEWPOINT): ICD-10-PCS | Mod: S$GLB,,, | Performed by: OBSTETRICS & GYNECOLOGY

## 2023-08-03 PROCEDURE — 82570 ASSAY OF URINE CREATININE: CPT | Performed by: OBSTETRICS & GYNECOLOGY

## 2023-08-03 PROCEDURE — 99999 PR PBB SHADOW E&M-EST. PATIENT-LVL III: ICD-10-PCS | Mod: PBBFAC,,, | Performed by: OBSTETRICS & GYNECOLOGY

## 2023-08-03 PROCEDURE — 80053 COMPREHEN METABOLIC PANEL: CPT | Performed by: OBSTETRICS & GYNECOLOGY

## 2023-08-03 PROCEDURE — 86592 SYPHILIS TEST NON-TREP QUAL: CPT | Performed by: OBSTETRICS & GYNECOLOGY

## 2023-08-03 PROCEDURE — 85025 COMPLETE CBC W/AUTO DIFF WBC: CPT | Performed by: OBSTETRICS & GYNECOLOGY

## 2023-08-03 PROCEDURE — 76819 FETAL BIOPHYS PROFIL W/O NST: CPT | Mod: S$GLB,,, | Performed by: OBSTETRICS & GYNECOLOGY

## 2023-08-03 PROCEDURE — 87081 CULTURE SCREEN ONLY: CPT | Performed by: OBSTETRICS & GYNECOLOGY

## 2023-08-03 PROCEDURE — 99999 PR PBB SHADOW E&M-EST. PATIENT-LVL III: CPT | Mod: PBBFAC,,, | Performed by: OBSTETRICS & GYNECOLOGY

## 2023-08-03 PROCEDURE — 36415 COLL VENOUS BLD VENIPUNCTURE: CPT | Performed by: OBSTETRICS & GYNECOLOGY

## 2023-08-03 PROCEDURE — 87389 HIV-1 AG W/HIV-1&-2 AB AG IA: CPT | Performed by: OBSTETRICS & GYNECOLOGY

## 2023-08-03 PROCEDURE — 0502F SUBSEQUENT PRENATAL CARE: CPT | Mod: CPTII,S$GLB,, | Performed by: OBSTETRICS & GYNECOLOGY

## 2023-08-03 NOTE — PROGRESS NOTES
No complaints. Reviewed FMC. GBBS, labs, consents done. Will add pre-e labs. BPP 8/8.  Reviewed BS log: greatly improved since started metformin bid. Will increase to 1 in the morning and 2 at night. Keep log.

## 2023-08-04 LAB — RPR SER QL: NORMAL

## 2023-08-06 LAB — BACTERIA SPEC AEROBE CULT: NORMAL

## 2023-08-07 ENCOUNTER — PATIENT MESSAGE (OUTPATIENT)
Dept: OBSTETRICS AND GYNECOLOGY | Facility: CLINIC | Age: 28
End: 2023-08-07
Payer: COMMERCIAL

## 2023-08-07 DIAGNOSIS — O24.415 GESTATIONAL DIABETES MELLITUS (GDM) IN THIRD TRIMESTER CONTROLLED ON ORAL HYPOGLYCEMIC DRUG: ICD-10-CM

## 2023-08-07 RX ORDER — METFORMIN HYDROCHLORIDE 500 MG/1
TABLET ORAL
Qty: 90 TABLET | Refills: 0 | Status: ON HOLD | OUTPATIENT
Start: 2023-08-07 | End: 2023-08-24 | Stop reason: HOSPADM

## 2023-08-07 RX ORDER — METFORMIN HYDROCHLORIDE 500 MG/1
TABLET ORAL
Qty: 270 TABLET | OUTPATIENT
Start: 2023-08-07

## 2023-08-07 NOTE — TELEPHONE ENCOUNTER
Refill Decision Note   Lorena Clineje  is requesting a refill authorization.  Brief Assessment and Rationale for Refill:  Quick Discontinue     Medication Therapy Plan:   Receipt confirmed by pharmacy (8/7/2023  3:19 PM CDT)      Comments:     Note composed:5:13 PM 08/07/2023

## 2023-08-10 ENCOUNTER — LAB VISIT (OUTPATIENT)
Dept: LAB | Facility: HOSPITAL | Age: 28
End: 2023-08-10
Attending: OBSTETRICS & GYNECOLOGY
Payer: COMMERCIAL

## 2023-08-10 ENCOUNTER — PROCEDURE VISIT (OUTPATIENT)
Dept: OBSTETRICS AND GYNECOLOGY | Facility: CLINIC | Age: 28
End: 2023-08-10
Payer: COMMERCIAL

## 2023-08-10 ENCOUNTER — ROUTINE PRENATAL (OUTPATIENT)
Dept: OBSTETRICS AND GYNECOLOGY | Facility: CLINIC | Age: 28
End: 2023-08-10
Payer: COMMERCIAL

## 2023-08-10 VITALS
DIASTOLIC BLOOD PRESSURE: 94 MMHG | WEIGHT: 209.44 LBS | BODY MASS INDEX: 34.85 KG/M2 | SYSTOLIC BLOOD PRESSURE: 120 MMHG

## 2023-08-10 DIAGNOSIS — O24.415 GESTATIONAL DIABETES MELLITUS (GDM) IN THIRD TRIMESTER CONTROLLED ON ORAL HYPOGLYCEMIC DRUG: ICD-10-CM

## 2023-08-10 DIAGNOSIS — O24.415 GESTATIONAL DIABETES MELLITUS (GDM) IN THIRD TRIMESTER CONTROLLED ON ORAL HYPOGLYCEMIC DRUG: Primary | ICD-10-CM

## 2023-08-10 DIAGNOSIS — Z3A.37 37 WEEKS GESTATION OF PREGNANCY: ICD-10-CM

## 2023-08-10 LAB
ALBUMIN SERPL BCP-MCNC: 2.5 G/DL (ref 3.5–5.2)
ALP SERPL-CCNC: 148 U/L (ref 55–135)
ALT SERPL W/O P-5'-P-CCNC: 5 U/L (ref 10–44)
ANION GAP SERPL CALC-SCNC: 13 MMOL/L (ref 8–16)
AST SERPL-CCNC: 11 U/L (ref 10–40)
BASOPHILS # BLD AUTO: 0.04 K/UL (ref 0–0.2)
BASOPHILS NFR BLD: 0.5 % (ref 0–1.9)
BILIRUB SERPL-MCNC: 0.2 MG/DL (ref 0.1–1)
BUN SERPL-MCNC: 8 MG/DL (ref 6–20)
CALCIUM SERPL-MCNC: 9 MG/DL (ref 8.7–10.5)
CHLORIDE SERPL-SCNC: 108 MMOL/L (ref 95–110)
CO2 SERPL-SCNC: 18 MMOL/L (ref 23–29)
CREAT SERPL-MCNC: 0.6 MG/DL (ref 0.5–1.4)
CREAT UR-MCNC: 96 MG/DL (ref 15–325)
DIFFERENTIAL METHOD: ABNORMAL
EOSINOPHIL # BLD AUTO: 0 K/UL (ref 0–0.5)
EOSINOPHIL NFR BLD: 0.3 % (ref 0–8)
ERYTHROCYTE [DISTWIDTH] IN BLOOD BY AUTOMATED COUNT: 15.9 % (ref 11.5–14.5)
EST. GFR  (NO RACE VARIABLE): >60 ML/MIN/1.73 M^2
GLUCOSE SERPL-MCNC: 87 MG/DL (ref 70–110)
HCT VFR BLD AUTO: 30.4 % (ref 37–48.5)
HGB BLD-MCNC: 9.2 G/DL (ref 12–16)
IMM GRANULOCYTES # BLD AUTO: 0.08 K/UL (ref 0–0.04)
IMM GRANULOCYTES NFR BLD AUTO: 1 % (ref 0–0.5)
LYMPHOCYTES # BLD AUTO: 1.6 K/UL (ref 1–4.8)
LYMPHOCYTES NFR BLD: 21.1 % (ref 18–48)
MCH RBC QN AUTO: 24 PG (ref 27–31)
MCHC RBC AUTO-ENTMCNC: 30.3 G/DL (ref 32–36)
MCV RBC AUTO: 79 FL (ref 82–98)
MONOCYTES # BLD AUTO: 0.6 K/UL (ref 0.3–1)
MONOCYTES NFR BLD: 8.2 % (ref 4–15)
NEUTROPHILS # BLD AUTO: 5.3 K/UL (ref 1.8–7.7)
NEUTROPHILS NFR BLD: 68.9 % (ref 38–73)
NRBC BLD-RTO: 0 /100 WBC
PLATELET # BLD AUTO: 264 K/UL (ref 150–450)
PMV BLD AUTO: 11.2 FL (ref 9.2–12.9)
POTASSIUM SERPL-SCNC: 4.5 MMOL/L (ref 3.5–5.1)
PROT SERPL-MCNC: 6.2 G/DL (ref 6–8.4)
PROT UR-MCNC: 10 MG/DL (ref 0–15)
PROT/CREAT UR: 0.1 MG/G{CREAT} (ref 0–0.2)
RBC # BLD AUTO: 3.84 M/UL (ref 4–5.4)
SODIUM SERPL-SCNC: 139 MMOL/L (ref 136–145)
WBC # BLD AUTO: 7.67 K/UL (ref 3.9–12.7)

## 2023-08-10 PROCEDURE — 76819 FETAL BIOPHYS PROFIL W/O NST: CPT | Mod: S$GLB,,, | Performed by: OBSTETRICS & GYNECOLOGY

## 2023-08-10 PROCEDURE — 76819 US OB/GYN EXTENDED PROCEDURE (VIEWPOINT): ICD-10-PCS | Mod: S$GLB,,, | Performed by: OBSTETRICS & GYNECOLOGY

## 2023-08-10 PROCEDURE — 99999 PR PBB SHADOW E&M-EST. PATIENT-LVL III: ICD-10-PCS | Mod: PBBFAC,,, | Performed by: OBSTETRICS & GYNECOLOGY

## 2023-08-10 PROCEDURE — 0502F SUBSEQUENT PRENATAL CARE: CPT | Mod: CPTII,S$GLB,, | Performed by: OBSTETRICS & GYNECOLOGY

## 2023-08-10 PROCEDURE — 0502F PR SUBSEQUENT PRENATAL CARE: ICD-10-PCS | Mod: CPTII,S$GLB,, | Performed by: OBSTETRICS & GYNECOLOGY

## 2023-08-10 PROCEDURE — 80053 COMPREHEN METABOLIC PANEL: CPT | Performed by: OBSTETRICS & GYNECOLOGY

## 2023-08-10 PROCEDURE — 36415 COLL VENOUS BLD VENIPUNCTURE: CPT | Performed by: OBSTETRICS & GYNECOLOGY

## 2023-08-10 PROCEDURE — 85025 COMPLETE CBC W/AUTO DIFF WBC: CPT | Performed by: OBSTETRICS & GYNECOLOGY

## 2023-08-10 PROCEDURE — 82570 ASSAY OF URINE CREATININE: CPT | Performed by: OBSTETRICS & GYNECOLOGY

## 2023-08-10 PROCEDURE — 99999 PR PBB SHADOW E&M-EST. PATIENT-LVL III: CPT | Mod: PBBFAC,,, | Performed by: OBSTETRICS & GYNECOLOGY

## 2023-08-10 NOTE — PROGRESS NOTES
No complaints. BS much better controlled since on metformin 500 q am and 1000 q pm. Has some slight vaginal itching. Is concerned a Bartholin's cyst may form. No yeast on exam. Nothing to treat at this time. She'll keep me posted. BPP 8/8. Labor/bleeding precautions given. Plan for induction in 38th week. Labs today.

## 2023-08-14 ENCOUNTER — TELEPHONE (OUTPATIENT)
Dept: OBSTETRICS AND GYNECOLOGY | Facility: CLINIC | Age: 28
End: 2023-08-14
Payer: COMMERCIAL

## 2023-08-14 ENCOUNTER — PATIENT MESSAGE (OUTPATIENT)
Dept: OBSTETRICS AND GYNECOLOGY | Facility: CLINIC | Age: 28
End: 2023-08-14
Payer: COMMERCIAL

## 2023-08-14 DIAGNOSIS — O16.3 ELEVATED BLOOD PRESSURE AFFECTING PREGNANCY IN THIRD TRIMESTER, ANTEPARTUM: ICD-10-CM

## 2023-08-14 DIAGNOSIS — O24.415 GESTATIONAL DIABETES MELLITUS (GDM) IN THIRD TRIMESTER CONTROLLED ON ORAL HYPOGLYCEMIC DRUG: Primary | ICD-10-CM

## 2023-08-14 NOTE — TELEPHONE ENCOUNTER
----- Message from Yulissa Westfall MD sent at 2023 10:38 AM CDT -----  CHIDI AND JOHNATHAN:  PLEASE SEND DATE AND TIME TO [facility] TO PUT ON GOOGLE AND EPIC AND TO [facility] TO PUT ON OB LIST  DON'T FORGET TO CALL PT AND LET HER KNOW ALSO#####          Procedure: answer Y where needed       Induction     Pitocin_____     Cytotec____     Balloon_Y___     Other______         Primary____      Repeat____    BTL _____________    Cerclage _________       Indication (elective/other): G2 DM on metformin (fair control), elevated BP    Date desired:   Time desired: MN    Due Date:     Cervical exam- (inductions only)   Dilation:                  0   Effacement:             0   Station:            -4   Consistency:         soft   Position:            posterior      CROSS SCORE____________

## 2023-08-17 ENCOUNTER — ROUTINE PRENATAL (OUTPATIENT)
Dept: OBSTETRICS AND GYNECOLOGY | Facility: CLINIC | Age: 28
End: 2023-08-17
Payer: COMMERCIAL

## 2023-08-17 ENCOUNTER — PROCEDURE VISIT (OUTPATIENT)
Dept: OBSTETRICS AND GYNECOLOGY | Facility: CLINIC | Age: 28
End: 2023-08-17
Payer: COMMERCIAL

## 2023-08-17 VITALS
SYSTOLIC BLOOD PRESSURE: 138 MMHG | WEIGHT: 210.56 LBS | DIASTOLIC BLOOD PRESSURE: 80 MMHG | BODY MASS INDEX: 35.04 KG/M2

## 2023-08-17 DIAGNOSIS — Z3A.38 38 WEEKS GESTATION OF PREGNANCY: ICD-10-CM

## 2023-08-17 DIAGNOSIS — O24.415 GESTATIONAL DIABETES MELLITUS (GDM) IN THIRD TRIMESTER CONTROLLED ON ORAL HYPOGLYCEMIC DRUG: ICD-10-CM

## 2023-08-17 DIAGNOSIS — O24.415 GESTATIONAL DIABETES MELLITUS (GDM) IN THIRD TRIMESTER CONTROLLED ON ORAL HYPOGLYCEMIC DRUG: Primary | ICD-10-CM

## 2023-08-17 PROCEDURE — 0502F SUBSEQUENT PRENATAL CARE: CPT | Mod: CPTII,S$GLB,, | Performed by: OBSTETRICS & GYNECOLOGY

## 2023-08-17 PROCEDURE — 76816 US OB/GYN EXTENDED PROCEDURE (VIEWPOINT): ICD-10-PCS | Mod: S$GLB,,, | Performed by: OBSTETRICS & GYNECOLOGY

## 2023-08-17 PROCEDURE — 0502F PR SUBSEQUENT PRENATAL CARE: ICD-10-PCS | Mod: CPTII,S$GLB,, | Performed by: OBSTETRICS & GYNECOLOGY

## 2023-08-17 PROCEDURE — 99999 PR PBB SHADOW E&M-EST. PATIENT-LVL III: CPT | Mod: PBBFAC,,, | Performed by: OBSTETRICS & GYNECOLOGY

## 2023-08-17 PROCEDURE — 76819 US OB/GYN EXTENDED PROCEDURE (VIEWPOINT): ICD-10-PCS | Mod: S$GLB,,, | Performed by: OBSTETRICS & GYNECOLOGY

## 2023-08-17 PROCEDURE — 76816 OB US FOLLOW-UP PER FETUS: CPT | Mod: S$GLB,,, | Performed by: OBSTETRICS & GYNECOLOGY

## 2023-08-17 PROCEDURE — 99999 PR PBB SHADOW E&M-EST. PATIENT-LVL III: ICD-10-PCS | Mod: PBBFAC,,, | Performed by: OBSTETRICS & GYNECOLOGY

## 2023-08-17 PROCEDURE — 76819 FETAL BIOPHYS PROFIL W/O NST: CPT | Mod: S$GLB,,, | Performed by: OBSTETRICS & GYNECOLOGY

## 2023-08-17 NOTE — H&P (VIEW-ONLY)
HISTORY AND PHYSICAL                                                OBSTETRICS          Subjective:       Lorena Parrish is a 28 y.o.  female with IUP at 38w4d weeks gestation who presents for induction of labor. This IUP is complicated by gestational diabetes (fair control with oral anti-hypoglycemic) .  Patient denies contractions, denies vaginal bleeding, denies LOF.   Fetal Movement: normal.     PMHx:   Past Medical History:   Diagnosis Date    ADHD (attention deficit hyperactivity disorder)     Contraception, device intrauterine 2018    Depression        PSHx:   Past Surgical History:   Procedure Laterality Date    KNEE ARTHROSCOPY      REDUCTION OF BOTH BREASTS  10/2021    Dr. Smith    STOMACH SURGERY  2016    gastric sleeve       All: Review of patient's allergies indicates:  No Known Allergies    Meds:   No medications prior to admission.       SH:   Social History     Socioeconomic History    Marital status:    Tobacco Use    Smoking status: Former     Current packs/day: 0.00    Smokeless tobacco: Never   Substance and Sexual Activity    Alcohol use: Yes     Comment: social    Drug use: No    Sexual activity: Yes     Birth control/protection: I.U.D.     Comment: Single:   Mirena  19       FH:   Family History   Problem Relation Age of Onset    Breast cancer Neg Hx     Colon cancer Neg Hx     Ovarian cancer Neg Hx        OBHx:   OB History    Para Term  AB Living   1 0 0 0 0 0   SAB IAB Ectopic Multiple Live Births   0 0 0 0 0      # Outcome Date GA Lbr Dany/2nd Weight Sex Delivery Anes PTL Lv   1 Current               Obstetric Comments   Menarche ~ 13       Objective:       There were no vitals taken for this visit.    There were no vitals filed for this visit.    General:   alert, appears stated age and cooperative   Lungs:   clear to auscultation bilaterally   Heart:   regular rate and rhythm, S1, S2 normal, no murmur, click, rub or gallop   Abdomen:   soft, non-tender; bowel sounds normal; no masses,  no organomegaly   Extremities negative edema, negative erythema   FHT: See admit FHT monitoring                 TOCO: See admit FHT monitoring   Presentations: cephalic by ultrasound   Cervix:     Dilation: 2cm    Effacement: 75%    Station:  -3    Consistency: soft    Position: posterior       EFW by 38 wk u/s: 3366 grams, 59%    Lab Review  Blood Type O pos  GBBS: negative  Rubella: Immune  RPR: nonreactive  HIV: negative  HepB: negative       Assessment:       38w4d weeks gestation, gestational diabetes (fair control with oral meds)    - for induction of labor     Plan:      Risks, benefits, alternatives and possible complications have been discussed in detail with the patient.   - Consents signed and to chart  - Admit to Labor and Delivery unit  - Epidural per Anesthesia prn  - Draw CBC, T&S    Post-Partum Hemorrhage risk - low

## 2023-08-17 NOTE — PROGRESS NOTES
Having some vulvar itching due to swelling. Baby moves a lot. Reviewed [facility]MC. Growth 59%. BPP 8/8. Gave labor/bleeding precautions. Induction Monday. BS mostly controlled on oral agent but not completely.

## 2023-08-20 ENCOUNTER — PATIENT MESSAGE (OUTPATIENT)
Dept: OBSTETRICS AND GYNECOLOGY | Facility: OTHER | Age: 28
End: 2023-08-20
Payer: COMMERCIAL

## 2023-08-21 ENCOUNTER — HOSPITAL ENCOUNTER (INPATIENT)
Facility: OTHER | Age: 28
LOS: 3 days | Discharge: HOME | End: 2023-08-24
Attending: OBSTETRICS & GYNECOLOGY | Admitting: OBSTETRICS & GYNECOLOGY
Payer: COMMERCIAL

## 2023-08-21 ENCOUNTER — ANESTHESIA (OUTPATIENT)
Dept: OBSTETRICS AND GYNECOLOGY | Facility: OTHER | Age: 28
End: 2023-08-21
Payer: COMMERCIAL

## 2023-08-21 ENCOUNTER — ANESTHESIA EVENT (OUTPATIENT)
Dept: OBSTETRICS AND GYNECOLOGY | Facility: OTHER | Age: 28
End: 2023-08-21
Payer: COMMERCIAL

## 2023-08-21 DIAGNOSIS — O16.3 ELEVATED BLOOD PRESSURE AFFECTING PREGNANCY IN THIRD TRIMESTER, ANTEPARTUM: ICD-10-CM

## 2023-08-21 DIAGNOSIS — Z98.891 STATUS POST CESAREAN DELIVERY: Primary | ICD-10-CM

## 2023-08-21 DIAGNOSIS — Z34.90 TERM PREGNANCY: ICD-10-CM

## 2023-08-21 DIAGNOSIS — O36.8390 NON-REASSURING FETAL CARDIOTOCOGRAPHIC TRACING: ICD-10-CM

## 2023-08-21 DIAGNOSIS — O24.415 GESTATIONAL DIABETES MELLITUS (GDM) IN THIRD TRIMESTER CONTROLLED ON ORAL HYPOGLYCEMIC DRUG: ICD-10-CM

## 2023-08-21 PROBLEM — K21.9 GASTROESOPHAGEAL REFLUX DISEASE WITHOUT ESOPHAGITIS: Status: ACTIVE | Noted: 2022-08-26

## 2023-08-21 PROBLEM — Z90.3 H/O GASTRIC SLEEVE: Status: ACTIVE | Noted: 2022-08-26

## 2023-08-21 LAB
ABO + RH BLD: NORMAL
ALBUMIN SERPL BCP-MCNC: 2.6 G/DL (ref 3.5–5.2)
ALP SERPL-CCNC: 172 U/L (ref 55–135)
ALT SERPL W/O P-5'-P-CCNC: 8 U/L (ref 10–44)
ANION GAP SERPL CALC-SCNC: 11 MMOL/L (ref 8–16)
AST SERPL-CCNC: 10 U/L (ref 10–40)
BASOPHILS # BLD AUTO: 0.03 K/UL (ref 0–0.2)
BASOPHILS NFR BLD: 0.4 % (ref 0–1.9)
BILIRUB SERPL-MCNC: 0.2 MG/DL (ref 0.1–1)
BLD GP AB SCN CELLS X3 SERPL QL: NORMAL
BUN SERPL-MCNC: 15 MG/DL (ref 6–20)
CALCIUM SERPL-MCNC: 9.2 MG/DL (ref 8.7–10.5)
CHLORIDE SERPL-SCNC: 109 MMOL/L (ref 95–110)
CO2 SERPL-SCNC: 17 MMOL/L (ref 23–29)
CREAT SERPL-MCNC: 0.6 MG/DL (ref 0.5–1.4)
CREAT UR-MCNC: 109.2 MG/DL (ref 15–325)
DIFFERENTIAL METHOD: ABNORMAL
EOSINOPHIL # BLD AUTO: 0 K/UL (ref 0–0.5)
EOSINOPHIL NFR BLD: 0.2 % (ref 0–8)
ERYTHROCYTE [DISTWIDTH] IN BLOOD BY AUTOMATED COUNT: 15.9 % (ref 11.5–14.5)
EST. GFR  (NO RACE VARIABLE): >60 ML/MIN/1.73 M^2
GLUCOSE SERPL-MCNC: 114 MG/DL (ref 70–110)
HCT VFR BLD AUTO: 28.6 % (ref 37–48.5)
HGB BLD-MCNC: 8.9 G/DL (ref 12–16)
IMM GRANULOCYTES # BLD AUTO: 0.08 K/UL (ref 0–0.04)
IMM GRANULOCYTES NFR BLD AUTO: 0.9 % (ref 0–0.5)
LYMPHOCYTES # BLD AUTO: 2.3 K/UL (ref 1–4.8)
LYMPHOCYTES NFR BLD: 26.8 % (ref 18–48)
MCH RBC QN AUTO: 24.3 PG (ref 27–31)
MCHC RBC AUTO-ENTMCNC: 31.1 G/DL (ref 32–36)
MCV RBC AUTO: 78 FL (ref 82–98)
MONOCYTES # BLD AUTO: 0.7 K/UL (ref 0.3–1)
MONOCYTES NFR BLD: 8 % (ref 4–15)
NEUTROPHILS # BLD AUTO: 5.4 K/UL (ref 1.8–7.7)
NEUTROPHILS NFR BLD: 63.7 % (ref 38–73)
NRBC BLD-RTO: 0 /100 WBC
PLATELET # BLD AUTO: 271 K/UL (ref 150–450)
PMV BLD AUTO: 9.9 FL (ref 9.2–12.9)
POCT GLUCOSE: 111 MG/DL (ref 70–110)
POCT GLUCOSE: 76 MG/DL (ref 70–110)
POCT GLUCOSE: 77 MG/DL (ref 70–110)
POCT GLUCOSE: 95 MG/DL (ref 70–110)
POCT GLUCOSE: 98 MG/DL (ref 70–110)
POTASSIUM SERPL-SCNC: 3.8 MMOL/L (ref 3.5–5.1)
PROT SERPL-MCNC: 6.4 G/DL (ref 6–8.4)
PROT UR-MCNC: 15 MG/DL (ref 0–15)
PROT/CREAT UR: 0.14 MG/G{CREAT} (ref 0–0.2)
RBC # BLD AUTO: 3.67 M/UL (ref 4–5.4)
SODIUM SERPL-SCNC: 137 MMOL/L (ref 136–145)
SPECIMEN OUTDATE: NORMAL
WBC # BLD AUTO: 8.54 K/UL (ref 3.9–12.7)

## 2023-08-21 PROCEDURE — 25000003 PHARM REV CODE 250: Performed by: STUDENT IN AN ORGANIZED HEALTH CARE EDUCATION/TRAINING PROGRAM

## 2023-08-21 PROCEDURE — 59200 INSERT CERVICAL DILATOR: CPT

## 2023-08-21 PROCEDURE — 62326 NJX INTERLAMINAR LMBR/SAC: CPT | Performed by: STUDENT IN AN ORGANIZED HEALTH CARE EDUCATION/TRAINING PROGRAM

## 2023-08-21 PROCEDURE — 51702 INSERT TEMP BLADDER CATH: CPT

## 2023-08-21 PROCEDURE — 01968 PR INSERT CATH,ART,PERCUT,SHORTTERM: ICD-10-PCS | Mod: AA,,, | Performed by: ANESTHESIOLOGY

## 2023-08-21 PROCEDURE — 01968 ANES/ANALG CS DLVR NEURAXIAL: CPT | Mod: AA,,, | Performed by: ANESTHESIOLOGY

## 2023-08-21 PROCEDURE — C1751 CATH, INF, PER/CENT/MIDLINE: HCPCS | Performed by: ANESTHESIOLOGY

## 2023-08-21 PROCEDURE — 85025 COMPLETE CBC W/AUTO DIFF WBC: CPT | Performed by: OBSTETRICS & GYNECOLOGY

## 2023-08-21 PROCEDURE — 63600175 PHARM REV CODE 636 W HCPCS: Performed by: STUDENT IN AN ORGANIZED HEALTH CARE EDUCATION/TRAINING PROGRAM

## 2023-08-21 PROCEDURE — 25000003 PHARM REV CODE 250: Performed by: OBSTETRICS & GYNECOLOGY

## 2023-08-21 PROCEDURE — 25000003 PHARM REV CODE 250

## 2023-08-21 PROCEDURE — 59514 PRA REAN DELIVERY ONLY: ICD-10-PCS | Mod: AA,,, | Performed by: ANESTHESIOLOGY

## 2023-08-21 PROCEDURE — 59514 CESAREAN DELIVERY ONLY: CPT | Mod: AA,,, | Performed by: ANESTHESIOLOGY

## 2023-08-21 PROCEDURE — 86900 BLOOD TYPING SEROLOGIC ABO: CPT | Performed by: OBSTETRICS & GYNECOLOGY

## 2023-08-21 PROCEDURE — 37000009 HC ANESTHESIA EA ADD 15 MINS: Performed by: OBSTETRICS & GYNECOLOGY

## 2023-08-21 PROCEDURE — 80053 COMPREHEN METABOLIC PANEL: CPT | Performed by: OBSTETRICS & GYNECOLOGY

## 2023-08-21 PROCEDURE — 71000039 HC RECOVERY, EACH ADD'L HOUR: Performed by: OBSTETRICS & GYNECOLOGY

## 2023-08-21 PROCEDURE — 63600175 PHARM REV CODE 636 W HCPCS: Performed by: OBSTETRICS & GYNECOLOGY

## 2023-08-21 PROCEDURE — 36415 COLL VENOUS BLD VENIPUNCTURE: CPT | Performed by: OBSTETRICS & GYNECOLOGY

## 2023-08-21 PROCEDURE — 11000001 HC ACUTE MED/SURG PRIVATE ROOM

## 2023-08-21 PROCEDURE — 63600175 PHARM REV CODE 636 W HCPCS

## 2023-08-21 PROCEDURE — 36004724 HC OB OR TIME LEV III - 1ST 15 MIN: Performed by: OBSTETRICS & GYNECOLOGY

## 2023-08-21 PROCEDURE — 86920 COMPATIBILITY TEST SPIN: CPT | Performed by: STUDENT IN AN ORGANIZED HEALTH CARE EDUCATION/TRAINING PROGRAM

## 2023-08-21 PROCEDURE — 37000008 HC ANESTHESIA 1ST 15 MINUTES: Performed by: OBSTETRICS & GYNECOLOGY

## 2023-08-21 PROCEDURE — 82570 ASSAY OF URINE CREATININE: CPT

## 2023-08-21 PROCEDURE — 27200710 HC EPIDURAL INFUSION PUMP SET: Performed by: ANESTHESIOLOGY

## 2023-08-21 PROCEDURE — 71000033 HC RECOVERY, INTIAL HOUR: Performed by: OBSTETRICS & GYNECOLOGY

## 2023-08-21 PROCEDURE — 36004725 HC OB OR TIME LEV III - EA ADD 15 MIN: Performed by: OBSTETRICS & GYNECOLOGY

## 2023-08-21 RX ORDER — FENTANYL/BUPIVACAINE/NS/PF 2MCG/ML-.1
PLASTIC BAG, INJECTION (ML) INJECTION CONTINUOUS PRN
Status: DISCONTINUED | OUTPATIENT
Start: 2023-08-21 | End: 2023-08-21

## 2023-08-21 RX ORDER — CALCIUM CARBONATE 200(500)MG
500 TABLET,CHEWABLE ORAL 3 TIMES DAILY PRN
Status: DISCONTINUED | OUTPATIENT
Start: 2023-08-21 | End: 2023-08-21

## 2023-08-21 RX ORDER — OXYCODONE HYDROCHLORIDE 5 MG/1
10 TABLET ORAL EVERY 4 HOURS PRN
Status: DISCONTINUED | OUTPATIENT
Start: 2023-08-21 | End: 2023-08-21

## 2023-08-21 RX ORDER — OXYTOCIN 10 [USP'U]/ML
INJECTION, SOLUTION INTRAMUSCULAR; INTRAVENOUS
Status: DISCONTINUED | OUTPATIENT
Start: 2023-08-21 | End: 2023-08-21

## 2023-08-21 RX ORDER — DEXAMETHASONE SODIUM PHOSPHATE 4 MG/ML
INJECTION, SOLUTION INTRA-ARTICULAR; INTRALESIONAL; INTRAMUSCULAR; INTRAVENOUS; SOFT TISSUE
Status: DISCONTINUED | OUTPATIENT
Start: 2023-08-21 | End: 2023-08-21

## 2023-08-21 RX ORDER — SIMETHICONE 80 MG
1 TABLET,CHEWABLE ORAL EVERY 6 HOURS PRN
Status: DISCONTINUED | OUTPATIENT
Start: 2023-08-21 | End: 2023-08-24 | Stop reason: HOSPADM

## 2023-08-21 RX ORDER — ONDANSETRON 8 MG/1
8 TABLET, ORALLY DISINTEGRATING ORAL EVERY 8 HOURS PRN
Status: DISCONTINUED | OUTPATIENT
Start: 2023-08-21 | End: 2023-08-24 | Stop reason: HOSPADM

## 2023-08-21 RX ORDER — KETOROLAC TROMETHAMINE 30 MG/ML
30 INJECTION, SOLUTION INTRAMUSCULAR; INTRAVENOUS EVERY 6 HOURS
Status: CANCELLED | OUTPATIENT
Start: 2023-08-22 | End: 2023-08-22

## 2023-08-21 RX ORDER — TRANEXAMIC ACID 10 MG/ML
1000 INJECTION, SOLUTION INTRAVENOUS ONCE AS NEEDED
Status: DISCONTINUED | OUTPATIENT
Start: 2023-08-21 | End: 2023-08-21

## 2023-08-21 RX ORDER — SODIUM CHLORIDE, SODIUM LACTATE, POTASSIUM CHLORIDE, CALCIUM CHLORIDE 600; 310; 30; 20 MG/100ML; MG/100ML; MG/100ML; MG/100ML
INJECTION, SOLUTION INTRAVENOUS CONTINUOUS PRN
Status: DISCONTINUED | OUTPATIENT
Start: 2023-08-21 | End: 2023-08-21

## 2023-08-21 RX ORDER — TERBUTALINE SULFATE 1 MG/ML
0.25 INJECTION SUBCUTANEOUS
Status: DISCONTINUED | OUTPATIENT
Start: 2023-08-21 | End: 2023-08-21

## 2023-08-21 RX ORDER — PROCHLORPERAZINE EDISYLATE 5 MG/ML
5 INJECTION INTRAMUSCULAR; INTRAVENOUS EVERY 6 HOURS PRN
Status: DISCONTINUED | OUTPATIENT
Start: 2023-08-21 | End: 2023-08-21

## 2023-08-21 RX ORDER — DIPHENOXYLATE HYDROCHLORIDE AND ATROPINE SULFATE 2.5; .025 MG/1; MG/1
2 TABLET ORAL EVERY 6 HOURS PRN
Status: DISCONTINUED | OUTPATIENT
Start: 2023-08-21 | End: 2023-08-24 | Stop reason: HOSPADM

## 2023-08-21 RX ORDER — AMOXICILLIN 250 MG
1 CAPSULE ORAL NIGHTLY PRN
Status: DISCONTINUED | OUTPATIENT
Start: 2023-08-21 | End: 2023-08-24 | Stop reason: HOSPADM

## 2023-08-21 RX ORDER — KETOROLAC TROMETHAMINE 30 MG/ML
INJECTION, SOLUTION INTRAMUSCULAR; INTRAVENOUS
Status: DISCONTINUED | OUTPATIENT
Start: 2023-08-21 | End: 2023-08-21

## 2023-08-21 RX ORDER — ACETAMINOPHEN 10 MG/ML
INJECTION, SOLUTION INTRAVENOUS
Status: DISCONTINUED | OUTPATIENT
Start: 2023-08-21 | End: 2023-08-21

## 2023-08-21 RX ORDER — SODIUM CHLORIDE 9 MG/ML
INJECTION, SOLUTION INTRAVENOUS
Status: DISCONTINUED | OUTPATIENT
Start: 2023-08-21 | End: 2023-08-21

## 2023-08-21 RX ORDER — MISOPROSTOL 200 UG/1
800 TABLET ORAL ONCE AS NEEDED
Status: DISCONTINUED | OUTPATIENT
Start: 2023-08-21 | End: 2023-08-21

## 2023-08-21 RX ORDER — FENTANYL/BUPIVACAINE/NS/PF 2MCG/ML-.1
PLASTIC BAG, INJECTION (ML) INJECTION
Status: COMPLETED
Start: 2023-08-21 | End: 2023-08-21

## 2023-08-21 RX ORDER — OXYTOCIN/RINGER'S LACTATE 30/500 ML
334 PLASTIC BAG, INJECTION (ML) INTRAVENOUS ONCE
Status: DISCONTINUED | OUTPATIENT
Start: 2023-08-21 | End: 2023-08-21

## 2023-08-21 RX ORDER — LIDOCAINE HYDROCHLORIDE 10 MG/ML
10 INJECTION INFILTRATION; PERINEURAL ONCE AS NEEDED
Status: DISCONTINUED | OUTPATIENT
Start: 2023-08-21 | End: 2023-08-21

## 2023-08-21 RX ORDER — SODIUM CITRATE AND CITRIC ACID MONOHYDRATE 334; 500 MG/5ML; MG/5ML
SOLUTION ORAL
Status: COMPLETED
Start: 2023-08-21 | End: 2023-08-21

## 2023-08-21 RX ORDER — MISOPROSTOL 200 UG/1
800 TABLET ORAL ONCE AS NEEDED
Status: DISCONTINUED | OUTPATIENT
Start: 2023-08-21 | End: 2023-08-24 | Stop reason: HOSPADM

## 2023-08-21 RX ORDER — OXYTOCIN/RINGER'S LACTATE 30/500 ML
334 PLASTIC BAG, INJECTION (ML) INTRAVENOUS ONCE AS NEEDED
Status: DISCONTINUED | OUTPATIENT
Start: 2023-08-21 | End: 2023-08-24 | Stop reason: HOSPADM

## 2023-08-21 RX ORDER — PROCHLORPERAZINE EDISYLATE 5 MG/ML
5 INJECTION INTRAMUSCULAR; INTRAVENOUS EVERY 6 HOURS PRN
Status: DISCONTINUED | OUTPATIENT
Start: 2023-08-21 | End: 2023-08-24 | Stop reason: HOSPADM

## 2023-08-21 RX ORDER — OXYTOCIN 10 [USP'U]/ML
10 INJECTION, SOLUTION INTRAMUSCULAR; INTRAVENOUS ONCE AS NEEDED
Status: DISCONTINUED | OUTPATIENT
Start: 2023-08-21 | End: 2023-08-21

## 2023-08-21 RX ORDER — MISOPROSTOL 100 MCG
25 TABLET ORAL EVERY 4 HOURS PRN
Status: DISCONTINUED | OUTPATIENT
Start: 2023-08-21 | End: 2023-08-21

## 2023-08-21 RX ORDER — OXYCODONE AND ACETAMINOPHEN 5; 325 MG/1; MG/1
1 TABLET ORAL EVERY 4 HOURS PRN
Status: DISCONTINUED | OUTPATIENT
Start: 2023-08-21 | End: 2023-08-24 | Stop reason: HOSPADM

## 2023-08-21 RX ORDER — CARBOPROST TROMETHAMINE 250 UG/ML
250 INJECTION, SOLUTION INTRAMUSCULAR
Status: DISCONTINUED | OUTPATIENT
Start: 2023-08-21 | End: 2023-08-21

## 2023-08-21 RX ORDER — METHYLERGONOVINE MALEATE 0.2 MG/ML
200 INJECTION INTRAVENOUS
Status: DISCONTINUED | OUTPATIENT
Start: 2023-08-21 | End: 2023-08-24 | Stop reason: HOSPADM

## 2023-08-21 RX ORDER — OXYTOCIN/RINGER'S LACTATE 30/500 ML
0-32 PLASTIC BAG, INJECTION (ML) INTRAVENOUS CONTINUOUS
Status: DISCONTINUED | OUTPATIENT
Start: 2023-08-21 | End: 2023-08-21

## 2023-08-21 RX ORDER — CEFAZOLIN SODIUM 1 G/3ML
INJECTION, POWDER, FOR SOLUTION INTRAMUSCULAR; INTRAVENOUS
Status: DISCONTINUED | OUTPATIENT
Start: 2023-08-21 | End: 2023-08-21

## 2023-08-21 RX ORDER — MORPHINE SULFATE 0.5 MG/ML
INJECTION, SOLUTION EPIDURAL; INTRATHECAL; INTRAVENOUS
Status: DISCONTINUED | OUTPATIENT
Start: 2023-08-21 | End: 2023-08-21

## 2023-08-21 RX ORDER — OXYTOCIN/RINGER'S LACTATE 30/500 ML
95 PLASTIC BAG, INJECTION (ML) INTRAVENOUS ONCE
Status: DISCONTINUED | OUTPATIENT
Start: 2023-08-21 | End: 2023-08-21

## 2023-08-21 RX ORDER — KETOROLAC TROMETHAMINE 30 MG/ML
30 INJECTION, SOLUTION INTRAMUSCULAR; INTRAVENOUS EVERY 6 HOURS
Status: DISCONTINUED | OUTPATIENT
Start: 2023-08-22 | End: 2023-08-21

## 2023-08-21 RX ORDER — DIPHENOXYLATE HYDROCHLORIDE AND ATROPINE SULFATE 2.5; .025 MG/1; MG/1
2 TABLET ORAL EVERY 6 HOURS PRN
Status: DISCONTINUED | OUTPATIENT
Start: 2023-08-21 | End: 2023-08-21

## 2023-08-21 RX ORDER — OXYTOCIN/RINGER'S LACTATE 30/500 ML
95 PLASTIC BAG, INJECTION (ML) INTRAVENOUS ONCE AS NEEDED
Status: DISCONTINUED | OUTPATIENT
Start: 2023-08-21 | End: 2023-08-24 | Stop reason: HOSPADM

## 2023-08-21 RX ORDER — ONDANSETRON HYDROCHLORIDE 2 MG/ML
INJECTION, SOLUTION INTRAMUSCULAR; INTRAVENOUS
Status: DISCONTINUED | OUTPATIENT
Start: 2023-08-21 | End: 2023-08-21

## 2023-08-21 RX ORDER — OXYCODONE AND ACETAMINOPHEN 10; 325 MG/1; MG/1
1 TABLET ORAL EVERY 4 HOURS PRN
Status: DISCONTINUED | OUTPATIENT
Start: 2023-08-21 | End: 2023-08-24 | Stop reason: HOSPADM

## 2023-08-21 RX ORDER — FAMOTIDINE 10 MG/ML
INJECTION INTRAVENOUS
Status: COMPLETED
Start: 2023-08-21 | End: 2023-08-21

## 2023-08-21 RX ORDER — MISOPROSTOL 200 UG/1
800 TABLET ORAL
Status: DISCONTINUED | OUTPATIENT
Start: 2023-08-21 | End: 2023-08-21

## 2023-08-21 RX ORDER — PHENYLEPHRINE HCL IN 0.9% NACL 1 MG/10 ML
SYRINGE (ML) INTRAVENOUS
Status: DISCONTINUED | OUTPATIENT
Start: 2023-08-21 | End: 2023-08-21

## 2023-08-21 RX ORDER — TRANEXAMIC ACID 10 MG/ML
1000 INJECTION, SOLUTION INTRAVENOUS ONCE AS NEEDED
Status: DISCONTINUED | OUTPATIENT
Start: 2023-08-21 | End: 2023-08-24 | Stop reason: HOSPADM

## 2023-08-21 RX ORDER — HYDROMORPHONE HYDROCHLORIDE 1 MG/ML
1 INJECTION, SOLUTION INTRAMUSCULAR; INTRAVENOUS; SUBCUTANEOUS EVERY 4 HOURS PRN
Status: CANCELLED | OUTPATIENT
Start: 2023-08-21

## 2023-08-21 RX ORDER — SIMETHICONE 80 MG
1 TABLET,CHEWABLE ORAL 4 TIMES DAILY PRN
Status: DISCONTINUED | OUTPATIENT
Start: 2023-08-21 | End: 2023-08-21

## 2023-08-21 RX ORDER — IBUPROFEN 400 MG/1
800 TABLET ORAL EVERY 8 HOURS
Status: CANCELLED | OUTPATIENT
Start: 2023-08-23

## 2023-08-21 RX ORDER — OXYTOCIN 10 [USP'U]/ML
10 INJECTION, SOLUTION INTRAMUSCULAR; INTRAVENOUS ONCE AS NEEDED
Status: DISCONTINUED | OUTPATIENT
Start: 2023-08-21 | End: 2023-08-24 | Stop reason: HOSPADM

## 2023-08-21 RX ORDER — OXYTOCIN/RINGER'S LACTATE 30/500 ML
334 PLASTIC BAG, INJECTION (ML) INTRAVENOUS ONCE AS NEEDED
Status: DISCONTINUED | OUTPATIENT
Start: 2023-08-21 | End: 2023-08-21

## 2023-08-21 RX ORDER — OXYCODONE HYDROCHLORIDE 5 MG/1
5 TABLET ORAL EVERY 4 HOURS PRN
Status: DISCONTINUED | OUTPATIENT
Start: 2023-08-21 | End: 2023-08-21

## 2023-08-21 RX ORDER — OXYTOCIN/RINGER'S LACTATE 30/500 ML
0-30 PLASTIC BAG, INJECTION (ML) INTRAVENOUS CONTINUOUS
Status: DISCONTINUED | OUTPATIENT
Start: 2023-08-21 | End: 2023-08-21

## 2023-08-21 RX ORDER — CARBOPROST TROMETHAMINE 250 UG/ML
250 INJECTION, SOLUTION INTRAMUSCULAR
Status: DISCONTINUED | OUTPATIENT
Start: 2023-08-21 | End: 2023-08-24 | Stop reason: HOSPADM

## 2023-08-21 RX ORDER — DIPHENHYDRAMINE HCL 25 MG
25 CAPSULE ORAL EVERY 4 HOURS PRN
Status: DISCONTINUED | OUTPATIENT
Start: 2023-08-21 | End: 2023-08-24 | Stop reason: HOSPADM

## 2023-08-21 RX ORDER — ONDANSETRON 8 MG/1
8 TABLET, ORALLY DISINTEGRATING ORAL EVERY 8 HOURS PRN
Status: DISCONTINUED | OUTPATIENT
Start: 2023-08-21 | End: 2023-08-21

## 2023-08-21 RX ORDER — LIDOCAINE HCL/EPINEPHRINE/PF 2%-1:200K
VIAL (ML) INJECTION
Status: DISCONTINUED | OUTPATIENT
Start: 2023-08-21 | End: 2023-08-21

## 2023-08-21 RX ORDER — HYDROCODONE BITARTRATE AND ACETAMINOPHEN 500; 5 MG/1; MG/1
TABLET ORAL
Status: DISCONTINUED | OUTPATIENT
Start: 2023-08-21 | End: 2023-08-21

## 2023-08-21 RX ORDER — ONDANSETRON 2 MG/ML
4 INJECTION INTRAMUSCULAR; INTRAVENOUS EVERY 6 HOURS PRN
Status: DISCONTINUED | OUTPATIENT
Start: 2023-08-21 | End: 2023-08-21

## 2023-08-21 RX ORDER — VENLAFAXINE 37.5 MG/1
75 TABLET ORAL DAILY
Status: DISCONTINUED | OUTPATIENT
Start: 2023-08-22 | End: 2023-08-24 | Stop reason: HOSPADM

## 2023-08-21 RX ORDER — OXYTOCIN/RINGER'S LACTATE 30/500 ML
95 PLASTIC BAG, INJECTION (ML) INTRAVENOUS ONCE
Status: DISCONTINUED | OUTPATIENT
Start: 2023-08-21 | End: 2023-08-24 | Stop reason: HOSPADM

## 2023-08-21 RX ORDER — FENTANYL CITRATE 50 UG/ML
INJECTION, SOLUTION INTRAMUSCULAR; INTRAVENOUS
Status: DISCONTINUED | OUTPATIENT
Start: 2023-08-21 | End: 2023-08-21

## 2023-08-21 RX ORDER — ACETAMINOPHEN 325 MG/1
650 TABLET ORAL EVERY 6 HOURS
Status: DISCONTINUED | OUTPATIENT
Start: 2023-08-22 | End: 2023-08-21

## 2023-08-21 RX ORDER — SODIUM CHLORIDE, SODIUM LACTATE, POTASSIUM CHLORIDE, CALCIUM CHLORIDE 600; 310; 30; 20 MG/100ML; MG/100ML; MG/100ML; MG/100ML
INJECTION, SOLUTION INTRAVENOUS CONTINUOUS
Status: DISCONTINUED | OUTPATIENT
Start: 2023-08-21 | End: 2023-08-21

## 2023-08-21 RX ORDER — METHYLERGONOVINE MALEATE 0.2 MG/ML
200 INJECTION INTRAVENOUS
Status: DISCONTINUED | OUTPATIENT
Start: 2023-08-21 | End: 2023-08-21

## 2023-08-21 RX ORDER — DEXTROSE, SODIUM CHLORIDE, SODIUM LACTATE, POTASSIUM CHLORIDE, AND CALCIUM CHLORIDE 5; .6; .31; .03; .02 G/100ML; G/100ML; G/100ML; G/100ML; G/100ML
INJECTION, SOLUTION INTRAVENOUS CONTINUOUS
Status: DISCONTINUED | OUTPATIENT
Start: 2023-08-21 | End: 2023-08-24 | Stop reason: HOSPADM

## 2023-08-21 RX ORDER — OXYTOCIN/RINGER'S LACTATE 30/500 ML
95 PLASTIC BAG, INJECTION (ML) INTRAVENOUS ONCE AS NEEDED
Status: DISCONTINUED | OUTPATIENT
Start: 2023-08-21 | End: 2023-08-21

## 2023-08-21 RX ADMIN — Medication 10 ML/HR: at 05:08

## 2023-08-21 RX ADMIN — OXYTOCIN 6 UNITS: 10 INJECTION, SOLUTION INTRAMUSCULAR; INTRAVENOUS at 07:08

## 2023-08-21 RX ADMIN — SODIUM CHLORIDE, SODIUM LACTATE, POTASSIUM CHLORIDE, AND CALCIUM CHLORIDE: 600; 310; 30; 20 INJECTION, SOLUTION INTRAVENOUS at 07:08

## 2023-08-21 RX ADMIN — LIDOCAINE HYDROCHLORIDE,EPINEPHRINE BITARTRATE 5 ML: 20; .005 INJECTION, SOLUTION EPIDURAL; INFILTRATION; INTRACAUDAL; PERINEURAL at 07:08

## 2023-08-21 RX ADMIN — Medication 100 MCG: at 07:08

## 2023-08-21 RX ADMIN — SODIUM CHLORIDE, POTASSIUM CHLORIDE, SODIUM LACTATE AND CALCIUM CHLORIDE: 600; 310; 30; 20 INJECTION, SOLUTION INTRAVENOUS at 02:08

## 2023-08-21 RX ADMIN — DEXAMETHASONE SODIUM PHOSPHATE 4 MG: 4 INJECTION INTRA-ARTICULAR; INTRALESIONAL; INTRAMUSCULAR; INTRAVENOUS; SOFT TISSUE at 07:08

## 2023-08-21 RX ADMIN — AZITHROMYCIN MONOHYDRATE 500 MG: 500 INJECTION, POWDER, LYOPHILIZED, FOR SOLUTION INTRAVENOUS at 06:08

## 2023-08-21 RX ADMIN — KETOROLAC TROMETHAMINE 30 MG: 30 INJECTION, SOLUTION INTRAMUSCULAR; INTRAVENOUS at 07:08

## 2023-08-21 RX ADMIN — FAMOTIDINE 20 MG: 10 INJECTION, SOLUTION INTRAVENOUS at 06:08

## 2023-08-21 RX ADMIN — ONDANSETRON 4 MG: 2 INJECTION INTRAMUSCULAR; INTRAVENOUS at 07:08

## 2023-08-21 RX ADMIN — MORPHINE SULFATE 1 MG: 0.5 INJECTION, SOLUTION EPIDURAL; INTRATHECAL; INTRAVENOUS at 07:08

## 2023-08-21 RX ADMIN — CEFAZOLIN 2 G: 330 INJECTION, POWDER, FOR SOLUTION INTRAMUSCULAR; INTRAVENOUS at 07:08

## 2023-08-21 RX ADMIN — SENNOSIDES AND DOCUSATE SODIUM 1 TABLET: 50; 8.6 TABLET ORAL at 10:08

## 2023-08-21 RX ADMIN — FENTANYL CITRATE 100 MCG: 0.05 INJECTION, SOLUTION INTRAMUSCULAR; INTRAVENOUS at 07:08

## 2023-08-21 RX ADMIN — FENTANYL CITRATE 50 MCG: 0.05 INJECTION, SOLUTION INTRAMUSCULAR; INTRAVENOUS at 07:08

## 2023-08-21 RX ADMIN — ACETAMINOPHEN 1000 MG: 10 INJECTION INTRAVENOUS at 07:08

## 2023-08-21 RX ADMIN — SODIUM CHLORIDE, POTASSIUM CHLORIDE, SODIUM LACTATE AND CALCIUM CHLORIDE: 600; 310; 30; 20 INJECTION, SOLUTION INTRAVENOUS at 01:08

## 2023-08-21 RX ADMIN — Medication 4 MILLI-UNITS/MIN: at 02:08

## 2023-08-21 RX ADMIN — SODIUM CITRATE AND CITRIC ACID MONOHYDRATE 30 ML: 500; 334 SOLUTION ORAL at 07:08

## 2023-08-21 NOTE — PROGRESS NOTES
"LABOR NOTE    S:  Complaints: No.  Epidural working:  yes  Resident to bedside for routine cervical check     O: /76   Pulse 79   Temp 97.6 °F (36.4 °C) (Oral)   Resp 16   Ht 5' 5" (1.651 m)   Wt 95.5 kg (210 lb 8.6 oz)   SpO2 97%   Breastfeeding No   BMI 35.04 kg/m²     FHT: 130, mod BTBV, + accels, - decels, Cat 1 (reassuring)  CTX: difficult to  on TOCO pit @ 24  SVE: 4/70/-2 IUPC placed    TIMELINE:  0500: 2/70/-3  0900: 3/70/-2, AROM clr  1300: 4/70/-2 IUPC placed    PLAN:  Continue Close Maternal/Fetal Monitoring  Pitocin Augmentation per protocol  Recheck 2-4 hours or PRN    Katherine Boecking MD   Ob/Gyn PGY-4    "

## 2023-08-21 NOTE — PROGRESS NOTES
S: 28 y.o.  at 38w4d with EDC of 2023, by Ultrasound, here for IOL for A2 GDM.    O:   BP: 118/71    FHT: 145 mod soledad +accels +late decelerations  [place]/IUPC: 170 mvus q 2-3 min  SVE:680/-2      ASSESSMENT: 38w4d   Patient Active Problem List   Diagnosis    Patellar instability of left knee    ADHD (attention deficit hyperactivity disorder)    Depression    Gastroesophageal reflux disease without esophagitis    H/O gastric sleeve       PLAN:  Med problems:A2GDM, AGA fetus  Fetus: Cat 2, negative GBS  3. Continue Close Maternal/Fetal Monitoring  4. Labor: Unchanged cervical check, unable to increase pitocin due to Cat 2 tracing intermittently.  Discussed with patient need for  given tracing and fetal intolerance to labor. Risks/benefits of  discussed. Dr. Westfall aware and agrees to proceed with .  Anesthesia aware, nursing aware of updated plan.  Karen Guajardo MD 2023 6:49 PM

## 2023-08-21 NOTE — PROGRESS NOTES
"LABOR NOTE    S:  MD to bedside for routine cervical exam.  Called by RN for recurrent late and variable decelerations to the 70s.  Epidural working:  yes    O: BP (!) 107/58   Pulse 78   Temp 97.8 °F (36.6 °C) (Oral)   Resp 16   Ht 5' 5" (1.651 m)   Wt 95.5 kg (210 lb 8.6 oz)   SpO2 98%   Breastfeeding No   BMI 35.04 kg/m²     FHT: 140 bpm, moderate variability, +accels, +decels, Cat 2 (generally reassuring after interventions)  CTX: q 2 minutes, pit @ 20  SVE: 5/70/-1    TIMELINE:  0500: 2/70/-3  0900: 3/70/-2, AROM clr  1300: 4/70/-2 IUPC placed  1400: 5/70/-1    PLAN:    Continue Close Maternal/Fetal Monitoring  Pitocin Augmentation per protocol  Recheck 4 hours or PRN      Aldo Fernandez MD MS  OB/Gyn  PGY-1      "

## 2023-08-21 NOTE — ANESTHESIA PREPROCEDURE EVALUATION
[facility]  Anesthesia Pre-Operative Evaluation         Patient Name: Lorena Parrish  YOB: 1995  MRN: [id number]    2023      Lorena Parrish is a 28 y.o. female  @ 38w4d who presents for IOL. IUP c/b obesity and gestational diabetes.     She otherwise has no noted bleeding/clotting disorders, significant cardiopulmonary disease, or spinal pathology.    OB History    Para Term  AB Living   1 0 0 0 0 0   SAB IAB Ectopic Multiple Live Births   0 0 0 0        # Outcome Date GA Lbr Dany/2nd Weight Sex Delivery Anes PTL Lv   1 Current               Obstetric Comments   Menarche ~ 13       Review of patient's allergies indicates:  No Known Allergies    Wt Readings from Last 1 Encounters:   23 0950 95.5 kg (210 lb 8.6 oz)       BP Readings from Last 3 Encounters:   23 138/80   08/10/23 (!) 120/94   23 120/88       Patient Active Problem List   Diagnosis    Patellar instability of left knee    ADHD (attention deficit hyperactivity disorder)    Depression       Past Surgical History:   Procedure Laterality Date    KNEE ARTHROSCOPY      REDUCTION OF BOTH BREASTS  10/2021    Dr. Smith    STOMACH SURGERY  2016    gastric sleeve       Social History     Socioeconomic History    Marital status:    Tobacco Use    Smoking status: Former     Current packs/day: 0.00    Smokeless tobacco: Never   Substance and Sexual Activity    Alcohol use: Yes     Comment: social    Drug use: No    Sexual activity: Yes     Birth control/protection: I.U.D.     Comment: Single:   Mirena  19         Chemistry        Component Value Date/Time     08/10/2023 1014    K 4.5 08/10/2023 1014     08/10/2023 1014    CO2 18 (L) 08/10/2023 1014    BUN 8 08/10/2023 1014    CREATININE 0.6 08/10/2023 1014    GLU 87 08/10/2023 1014        Component Value Date/Time    CALCIUM 9.0 08/10/2023 1014    ALKPHOS 148 (H) 08/10/2023 1014     "AST 11 08/10/2023 1014    ALT 5 (L) 08/10/2023 1014    BILITOT 0.2 08/10/2023 1014            Lab Results   Component Value Date    WBC 7.67 08/10/2023    HGB 9.2 (L) 08/10/2023    HCT 30.4 (L) 08/10/2023    MCV 79 (L) 08/10/2023     08/10/2023       No results for input(s): "PT", "INR", "PROTIME", "APTT" in the last 72 hours.          Pre-op Assessment    I have reviewed the Patient Summary Reports.    I have reviewed the NPO Status.      Review of Systems  Anesthesia Hx:   Denies Personal Hx of Anesthesia complications.   Hematology/Oncology:  Hematology Normal   Oncology Normal     Cardiovascular:  Cardiovascular Normal     Pulmonary:  Pulmonary Normal    Renal/:  Renal/ Normal     Musculoskeletal:  Musculoskeletal Normal    OB/GYN/PEDS:  Denies Problems with Previous Pregnancy / Delivery    Neurological:  Neurology Normal    Endocrine:   Diabetes, well controlled  Obesity / BMI > 30  Psych:   depression          Physical Exam  General: Alert and Oriented    Airway:  Mallampati: III / II  Mouth Opening: Normal  TM Distance: Normal  Tongue: Normal  Neck ROM: Normal ROM    Dental:  Intact        Anesthesia Plan  Type of Anesthesia, risks & benefits discussed:    Anesthesia Type: Epidural, Spinal, CSE  Intra-op Monitoring Plan: Standard ASA Monitors  Post Op Pain Control Plan: epidural analgesia, intrathecal opioid and multimodal analgesia  Informed Consent: Informed consent signed with the Patient and all parties understand the risks and agree with anesthesia plan.  All questions answered.   ASA Score: 2  Day of Surgery Review of History & Physical: H&P Update referred to the surgeon/provider.    Ready For Surgery From Anesthesia Perspective.     .      "

## 2023-08-21 NOTE — INTERVAL H&P NOTE
Lorena Parrish is 28 y.o.  at 38w4d wga presenting for IOL.     FHT: Baseline 135 bpm, moderate BTBV, +accels, -decels; Cat 2 (reassuring)  [place]: irregular  Presentation: cephalic by ultrasound    SVE: /-3    1) Induction of Labor  - Plan for pitocin (ingrid too frequently for cytotec)  - [place] placed without difficulty    2) A2GDM  - Q4/q2 latent/active glucose checks  - Fasting BG PPD#1    Contraception: Per Dr. Westfall     Attending Attestation  I agree with the above resident note. Pt seen and examined, chart and labs reviewed, plan of care as above.    Joyce Vaughn MD  OB Hospitalist  2023

## 2023-08-21 NOTE — PROGRESS NOTES
"LABOR NOTE    S:  Complaints: No.  Epidural working:  yes  Resident to bedside for routine cervical check     O: /63   Pulse 87   Temp 97.9 °F (36.6 °C) (Oral)   Resp 16   Ht 5' 5" (1.651 m)   Wt 95.5 kg (210 lb 8.6 oz)   SpO2 97%   Breastfeeding No   BMI 35.04 kg/m²     FHT: 130, mod BTBV, + accels, - decels, Cat 1 (reassuring)  CTX: q 4-5 minutes, pit @ 20  SVE: 3/70/-2    TIMELINE:  0500: 2/70/-3  0900: 3/70/-2, AROM clr    PLAN:  Continue Close Maternal/Fetal Monitoring  Pitocin Augmentation per protocol  Recheck 2-4 hours or PRN    Sydnee Huang MD  OB/GYN PGY-2    "

## 2023-08-21 NOTE — PLAN OF CARE
Problem: Adult Inpatient Plan of Care  Goal: Plan of Care Review  Outcome: Ongoing, Progressing  Goal: Patient-Specific Goal (Individualized)  Outcome: Ongoing, Progressing  Goal: Absence of Hospital-Acquired Illness or Injury  Outcome: Ongoing, Progressing  Goal: Optimal Comfort and Wellbeing  Outcome: Ongoing, Progressing  Goal: Readiness for Transition of Care  Outcome: Ongoing, Progressing     Problem: Infection  Goal: Absence of Infection Signs and Symptoms  Outcome: Ongoing, Progressing     Problem:  Fall Injury Risk  Goal: Absence of Fall, Infant Drop and Related Injury  Outcome: Ongoing, Progressing     Problem: Diabetes in Pregnancy  Goal: Blood Glucose Level Within Targeted Range  Outcome: Ongoing, Progressing     Problem: Bleeding (Labor)  Goal: Hemostasis  Outcome: Ongoing, Progressing     Problem: Change in Fetal Wellbeing (Labor)  Goal: Stable Fetal Wellbeing  Outcome: Ongoing, Progressing     Problem: Delayed Labor Progression (Labor)  Goal: Effective Progression to Delivery  Outcome: Ongoing, Progressing     Problem: Infection (Labor)  Goal: Absence of Infection Signs and Symptoms  Outcome: Ongoing, Progressing     Problem: Labor Pain (Labor)  Goal: Acceptable Pain Control  Outcome: Ongoing, Progressing     Problem: Uterine Tachysystole (Labor)  Goal: Normal Uterine Contraction Pattern  Outcome: Ongoing, Progressing

## 2023-08-21 NOTE — PLAN OF CARE
Problem: Adult Inpatient Plan of Care  Goal: Plan of Care Review  Outcome: Ongoing, Progressing  Goal: Patient-Specific Goal (Individualized)  Outcome: Ongoing, Progressing  Goal: Absence of Hospital-Acquired Illness or Injury  Outcome: Ongoing, Progressing  Goal: Optimal Comfort and Wellbeing  Outcome: Ongoing, Progressing  Goal: Readiness for Transition of Care  Outcome: Ongoing, Progressing     Problem: Infection  Goal: Absence of Infection Signs and Symptoms  Outcome: Ongoing, Progressing     Problem:  Fall Injury Risk  Goal: Absence of Fall, Infant Drop and Related Injury  Outcome: Ongoing, Progressing     Problem: Diabetes in Pregnancy  Goal: Blood Glucose Level Within Targeted Range  Outcome: Ongoing, Progressing

## 2023-08-21 NOTE — PROGRESS NOTES
"LABOR NOTE    S:  MD to bedside for routine cervical exam. Epidural working:  yes  No pt concerns at this time    O: /68   Pulse 83   Temp 98 °F (36.7 °C) (Oral)   Resp 16   Ht 5' 5" (1.651 m)   Wt 95.5 kg (210 lb 8.6 oz)   SpO2 97%   Breastfeeding No   BMI 35.04 kg/m²     FHT: 140 bpm, moderate variability, +accels, -decels, Cat 1 (reassuring)  CTX: q 2 minutes, pit @ 16  SVE: 6/80/-1    TIMELINE:  0500: 2/70/-3  0900: 3/70/-2, AROM clr  1300: 4/70/-2 IUPC placed  1400: 5/70/-1  1615: 6/80/-1    PLAN:    Continue Close Maternal/Fetal Monitoring  Pitocin Augmentation per protocol  Recheck 2 hours or PRN    Aldo Fernandez MD MS  OB/Gyn  PGY-1      "

## 2023-08-21 NOTE — ANESTHESIA PROCEDURE NOTES
Epidural    Patient location during procedure: OB   Reason for block: primary anesthetic   Reason for block: labor analgesia requested by patient and obstetrician  Diagnosis: IUP   Start time: 8/21/2023 5:32 AM  Timeout: 8/21/2023 5:32 AM  End time: 8/21/2023 5:45 AM  Surgery related to: Vaginal Delivery    Staffing  Performing Provider: James Alegria DO  Authorizing Provider: Zena Milner MD    Staffing  Performed by: James Alegria DO  Authorized by: Zena Milner MD        Preanesthetic Checklist  Completed: patient identified, IV checked, site marked, risks and benefits discussed, surgical consent, monitors and equipment checked, pre-op evaluation, timeout performed, anesthesia consent given, hand hygiene performed and patient being monitored  Preparation  Patient position: sitting  Prep: ChloraPrep  Patient monitoring: Pulse Ox  Reason for block: primary anesthetic   Epidural  Skin Anesthetic: lidocaine 1%  Skin Wheal: 3 mL  Administration type: continuous  Approach: midline  Interspace: L3-4    Injection technique: DOUG saline  Needle and Epidural Catheter  Needle type: Tuohy   Needle gauge: 17  Needle length: 3.5 inches  Needle insertion depth: 7 cm  Catheter type: [employer]  Catheter size: 19 G  Catheter at skin depth: 11 cm    Test dose: 3 mL of lidocaine 1.5% with Epi 1-to-200,000  Additional Documentation: incremental injection, negative aspiration for heme and CSF, no paresthesia on injection, no signs/symptoms of IV or SA injection, no significant pain on injection and no significant complaints from patient  Needle localization: anatomical landmarks  Medications:  Volume per aspiration: 5 mL  Time between aspirations: 5 minutes   Assessment  Ease of block: easy  Patient's tolerance of the procedure: comfortable throughout block and no complaints No inadvertent dural puncture with Tuohy.  Dural puncture performed with spinal needle.

## 2023-08-22 LAB
BASOPHILS # BLD AUTO: 0.03 K/UL (ref 0–0.2)
BASOPHILS NFR BLD: 0.2 % (ref 0–1.9)
DIFFERENTIAL METHOD: ABNORMAL
EOSINOPHIL # BLD AUTO: 0 K/UL (ref 0–0.5)
EOSINOPHIL NFR BLD: 0 % (ref 0–8)
ERYTHROCYTE [DISTWIDTH] IN BLOOD BY AUTOMATED COUNT: 15.9 % (ref 11.5–14.5)
GLUCOSE SERPL-MCNC: 108 MG/DL (ref 70–110)
HCT VFR BLD AUTO: 22.8 % (ref 37–48.5)
HGB BLD-MCNC: 7.1 G/DL (ref 12–16)
IMM GRANULOCYTES # BLD AUTO: 0.11 K/UL (ref 0–0.04)
IMM GRANULOCYTES NFR BLD AUTO: 0.7 % (ref 0–0.5)
LYMPHOCYTES # BLD AUTO: 1.1 K/UL (ref 1–4.8)
LYMPHOCYTES NFR BLD: 7.7 % (ref 18–48)
MCH RBC QN AUTO: 24.1 PG (ref 27–31)
MCHC RBC AUTO-ENTMCNC: 31.1 G/DL (ref 32–36)
MCV RBC AUTO: 78 FL (ref 82–98)
MONOCYTES # BLD AUTO: 1.3 K/UL (ref 0.3–1)
MONOCYTES NFR BLD: 8.9 % (ref 4–15)
NEUTROPHILS # BLD AUTO: 12.2 K/UL (ref 1.8–7.7)
NEUTROPHILS NFR BLD: 82.5 % (ref 38–73)
NRBC BLD-RTO: 0 /100 WBC
PLATELET # BLD AUTO: 237 K/UL (ref 150–450)
PMV BLD AUTO: 10 FL (ref 9.2–12.9)
POCT GLUCOSE: 125 MG/DL (ref 70–110)
RBC # BLD AUTO: 2.94 M/UL (ref 4–5.4)
WBC # BLD AUTO: 14.78 K/UL (ref 3.9–12.7)

## 2023-08-22 PROCEDURE — 85025 COMPLETE CBC W/AUTO DIFF WBC: CPT | Performed by: OBSTETRICS & GYNECOLOGY

## 2023-08-22 PROCEDURE — 11000001 HC ACUTE MED/SURG PRIVATE ROOM

## 2023-08-22 PROCEDURE — 25000003 PHARM REV CODE 250: Performed by: OBSTETRICS & GYNECOLOGY

## 2023-08-22 PROCEDURE — 59514 CESAREAN DELIVERY ONLY: CPT | Mod: 82,AT,, | Performed by: OBSTETRICS & GYNECOLOGY

## 2023-08-22 PROCEDURE — 82947 ASSAY GLUCOSE BLOOD QUANT: CPT | Performed by: OBSTETRICS & GYNECOLOGY

## 2023-08-22 PROCEDURE — 36415 COLL VENOUS BLD VENIPUNCTURE: CPT | Performed by: OBSTETRICS & GYNECOLOGY

## 2023-08-22 PROCEDURE — 63600175 PHARM REV CODE 636 W HCPCS: Performed by: OBSTETRICS & GYNECOLOGY

## 2023-08-22 PROCEDURE — 59514 PR CESAREAN DELIVERY ONLY: ICD-10-PCS | Mod: 82,AT,, | Performed by: OBSTETRICS & GYNECOLOGY

## 2023-08-22 PROCEDURE — 59510 CESAREAN DELIVERY: CPT | Mod: AT,,, | Performed by: OBSTETRICS & GYNECOLOGY

## 2023-08-22 PROCEDURE — 59510 PR FULL ROUT OBSTE CARE,CESAREAN DELIV: ICD-10-PCS | Mod: AT,,, | Performed by: OBSTETRICS & GYNECOLOGY

## 2023-08-22 RX ORDER — KETOROLAC TROMETHAMINE 30 MG/ML
30 INJECTION, SOLUTION INTRAMUSCULAR; INTRAVENOUS EVERY 6 HOURS
Status: DISCONTINUED | OUTPATIENT
Start: 2023-08-22 | End: 2023-08-22

## 2023-08-22 RX ORDER — FENTANYL/BUPIVACAINE/NS/PF 2MCG/ML-.1
PLASTIC BAG, INJECTION (ML) INJECTION CONTINUOUS
Status: DISCONTINUED | OUTPATIENT
Start: 2023-08-22 | End: 2023-08-22

## 2023-08-22 RX ORDER — DOCUSATE SODIUM 100 MG/1
200 CAPSULE, LIQUID FILLED ORAL 2 TIMES DAILY
Status: DISCONTINUED | OUTPATIENT
Start: 2023-08-22 | End: 2023-08-24 | Stop reason: HOSPADM

## 2023-08-22 RX ORDER — ACETAMINOPHEN 325 MG/1
650 TABLET ORAL EVERY 6 HOURS PRN
Status: DISCONTINUED | OUTPATIENT
Start: 2023-08-22 | End: 2023-08-24 | Stop reason: HOSPADM

## 2023-08-22 RX ORDER — ONDANSETRON 2 MG/ML
4 INJECTION INTRAMUSCULAR; INTRAVENOUS ONCE
Status: DISCONTINUED | OUTPATIENT
Start: 2023-08-22 | End: 2023-08-24 | Stop reason: HOSPADM

## 2023-08-22 RX ORDER — NALBUPHINE HYDROCHLORIDE 10 MG/ML
2.5 INJECTION, SOLUTION INTRAMUSCULAR; INTRAVENOUS; SUBCUTANEOUS ONCE
Status: DISCONTINUED | OUTPATIENT
Start: 2023-08-22 | End: 2023-08-24 | Stop reason: HOSPADM

## 2023-08-22 RX ORDER — DIPHENHYDRAMINE HYDROCHLORIDE 50 MG/ML
12.5 INJECTION INTRAMUSCULAR; INTRAVENOUS EVERY 4 HOURS PRN
Status: DISCONTINUED | OUTPATIENT
Start: 2023-08-22 | End: 2023-08-24 | Stop reason: HOSPADM

## 2023-08-22 RX ORDER — IBUPROFEN 400 MG/1
800 TABLET ORAL EVERY 8 HOURS
Status: DISCONTINUED | OUTPATIENT
Start: 2023-08-22 | End: 2023-08-24 | Stop reason: HOSPADM

## 2023-08-22 RX ORDER — SODIUM CITRATE AND CITRIC ACID MONOHYDRATE 334; 500 MG/5ML; MG/5ML
30 SOLUTION ORAL ONCE
Status: DISCONTINUED | OUTPATIENT
Start: 2023-08-22 | End: 2023-08-24 | Stop reason: HOSPADM

## 2023-08-22 RX ORDER — FAMOTIDINE 10 MG/ML
20 INJECTION INTRAVENOUS ONCE
Status: DISCONTINUED | OUTPATIENT
Start: 2023-08-22 | End: 2023-08-24 | Stop reason: HOSPADM

## 2023-08-22 RX ADMIN — OXYCODONE HYDROCHLORIDE AND ACETAMINOPHEN 1 TABLET: 5; 325 TABLET ORAL at 09:08

## 2023-08-22 RX ADMIN — VENLAFAXINE 75 MG: 37.5 TABLET ORAL at 08:08

## 2023-08-22 RX ADMIN — KETOROLAC TROMETHAMINE 30 MG: 30 INJECTION, SOLUTION INTRAMUSCULAR; INTRAVENOUS at 11:08

## 2023-08-22 RX ADMIN — ACETAMINOPHEN 650 MG: 325 TABLET, FILM COATED ORAL at 11:08

## 2023-08-22 RX ADMIN — OXYCODONE AND ACETAMINOPHEN 1 TABLET: 10; 325 TABLET ORAL at 08:08

## 2023-08-22 RX ADMIN — KETOROLAC TROMETHAMINE 30 MG: 30 INJECTION, SOLUTION INTRAMUSCULAR; INTRAVENOUS at 06:08

## 2023-08-22 RX ADMIN — KETOROLAC TROMETHAMINE 30 MG: 30 INJECTION, SOLUTION INTRAMUSCULAR; INTRAVENOUS at 05:08

## 2023-08-22 RX ADMIN — IBUPROFEN 800 MG: 400 TABLET ORAL at 09:08

## 2023-08-22 RX ADMIN — DOCUSATE SODIUM 200 MG: 100 CAPSULE, LIQUID FILLED ORAL at 08:08

## 2023-08-22 RX ADMIN — ACETAMINOPHEN 650 MG: 325 TABLET, FILM COATED ORAL at 06:08

## 2023-08-22 RX ADMIN — SIMETHICONE 80 MG: 80 TABLET, CHEWABLE ORAL at 09:08

## 2023-08-22 NOTE — L&D DELIVERY NOTE
[facility]  Obstetrics  Operative Note    SUMMARY     Date of Procedure: 2023     Procedure: Procedure(s) (LRB):   SECTION (N/A)       Surgeon(s) and Role:     * Yulissa Westfall MD - Primary     * Karen Guajardo MD - Assisting        Pre-Operative Diagnosis: Non-reassuring fetal cardiotocographic tracing [O36.8390]    Post-Operative Diagnosis: Post-Op Diagnosis Codes:     * Non-reassuring fetal cardiotocographic tracing [O36.8390]    Anesthesia: Spinal/Epidural    Description of the Findings of the Procedure: normal uterus, tubes and ovaries    Complications: No    Estimated Blood Loss (EBL):  665 ml    Implants: * No implants in log *    Specimens:   Specimen (24h ago, onward)      None                    Condition: Good    Disposition: PACU - hemodynamically stable.    Attestation: A qualified resident physician was not available.    Procedure in detail:    The patient was taken to the operating room where epidural/spinal anesthesia was found to be adequate. She was then prepped and draped in the normal sterile fashion. Allis clamp was used to verify adequate anesthesia. A walton catheter was in place.     After Time Out was performed, a scalpel was used to make a Pfannensteil incision. The knife was used to carry down to the underlying layer of fascia. The fascia was then incised in the midline. The curved clark scissors were then used to extend the fascia incision laterally. The fascia was then elevated using the [facility] clamps and extended both inferiorly and superiorly using the curved clark scissors. The rectus muscles were then  in the midline and the peritoneum was then entered bluntly and extended but bluntly and sharply with the curved clark scissors. The bladder blade was then inserted and the vesicouterine peritoneum was then entered sharply with the Metzenbaum scissors and extended laterally and the bladder flap was created digitally. The bladder blade  was then reinserted.     The inside scalpel was then used to make a LOW TRANSVERSE incision in the uterus. This was extended bluntly. The amniotic fluid was noted to be clear. The infants head was delivered atraumatically and the infant's body was delivered atraumatically. The nose and mouth were suctioned with the bulb suction. The cord was clamped and cut and the baby was handed off to awaiting pediatric attendant. The placenta was then removed manually and the uterus was then exteriorized and cleared of all clots and debris. The bladder blade was then reinserted.     The uterine incision was then closed using a #0 Vicryl in a running locked suture. A second imbricating suture of #0 Vicryl was used to close a second layer with excellent hemostasis. The abdomen was then irrigated and cleared of all clots and debris. The uterine incision was then reexamined and noted to have excellent hemostasis. The uterus was then returned to the abdomen. The bladder blade was replaced and then uterine incision was then reexamined and noted to have excellent hemostasis.     The peritoneum and rectus muscles were then re approximated using 2-0 Vicryl in a running fashion. The rectus muscles were then irrigated and and bleeding areas were cauterized using the Bovie with excellent hemostasis. The fascia was then closed using #1 Vicryl in a running fashion. The subcutaneous tissue was then cauterized for any small bleeding areas. 2-0 plain gut was then used to re approximate the subcutaneous space. 4-0 Monocryl was then used to close the skin in a subcuticular fashion. The patient tolerated the procedure well. Sponge lap and needle counts were correct x 2.        VTE Risk Mitigation (From admission, onward)      None               Delivery Information for Vincenzo Parrish    Birth information:  YOB: 2022   Time of birth: 7:25 PM   Sex: female   Head Delivery Date/Time: 2023  7:25 PM   Delivery type: ,  "Low Transverse   Gestational Age: 38w4d        Delivery Providers    Delivering clinician: Yulissa Westfall MD   Provider Role    Karen Guajardo MD [facility] Hospitalist    Danae Clarke RN Circulator    Marielos Andrews  Surgical Tech    Miriam Fu RN Registered Nurse    Luanne Babb RN Registered Nurse              Measurements    Weight: 3240 g  Weight (lbs): 7 lb 2.3 oz  Length: 49.5 cm  Length (in): 19.5"  Head circumference: 34.3 cm  Chest circumference: 34.9 cm         Apgars    Living status: Living  Apgar Component Scores:  1 min.:  5 min.:  10 min.:  15 min.:  20 min.:    Skin color:  0  1       Heart rate:  2  2       Reflex irritability:  2  2       Muscle tone:  2  2       Respiratory effort:  1  2       Total:  7  9       Apgars assigned by: RAMIRO BABB RN         Operative Delivery    Forceps attempted?: No  Vacuum extractor attempted?: No         Shoulder Dystocia    Shoulder dystocia present?: No           Presentation    Presentation: Vertex  Position: Left Occiput Posterior           Interventions/Resuscitation    Method: Bulb Suctioning, Blow By Oxygen, Tactile Stimulation, Deep Suctioning       Cord    Vessels: 3 vessels  Complications: Body  Nuchal Intervention: reduced  Delayed Cord Clamping?: Yes  Cord Clamped Date/Time: 2023  7:26 PM  Cord Blood Disposition: Sent with Baby  Gases Sent?: No  Stem Cell Collection (by MD): No       Placenta    Placenta delivery date/time: 2023  Placenta removal: Spontaneous  Placenta appearance: Intact  Placenta disposition: Discarded           Labor Events:       labor: No     Labor Onset Date/Time:         Dilation Complete Date/Time:         Start Pushing Date/Time:         Start Pushing Date/Time:       Rupture Date/Time: 23  0904         Rupture type: ARM (Artificial Rupture)         Fluid Amount:       Fluid Color: Clear               steroids: Unknown     Antibiotics given for GBS: No     Induction: "       Indications for induction:  Gestational Diabetic     Augmentation: oxytocin     Indications for augmentation:       Labor complications: Fetal Intolerance     Additional complications:          Cervical ripening:                     Delivery:      Episiotomy:       Indication for Episiotomy:       Perineal Lacerations:   Repaired:      Periurethral Laceration:   Repaired:     Labial Laceration:   Repaired:     Sulcus Laceration:   Repaired:     Vaginal Laceration:   Repaired:     Cervical Laceration:   Repaired:     Repair suture:       Repair # of packets:       Last Value - EBL - Nursing (mL):       Sum - EBL - Nursing (mL): 0     Last Value - EBL - Anesthesia (mL):      Calculated QBL (mL): 665      Vaginal Sweep Performed:       Surgicount Correct:       Vaginal Packing:   Quantity:       Other providers:       Anesthesia    Method: Epidural, Spinal          Details (if applicable):  Trial of Labor Yes    Categorization: Primary    Priority: Urgent   Indications for : Fetal heart rate abnormalities   Incision Type: low transverse     Additional  information:  Forceps:    Vacuum:    Breech:    Observed anomalies    Other (Comments):

## 2023-08-22 NOTE — LACTATION NOTE
"   08/22/23 1040   Maternal Assessment   Breast Shape Bilateral:;round   Breast Density Bilateral:;soft   Areola Bilateral:;elastic;surgical scarring  (breast reduction)   Nipples Bilateral:;flat   Maternal Infant Feeding   Maternal Emotional State assist needed  (max)   Infant Positioning clutch/football   Latch Assistance   (max)   Equipment Type   Breast Pump Type double electric, hospital grade  (brought to room. visitors in room. pt to call when ready to pump)   Breast Pump Flange Type hard     Pt reports baby has been breastfeeding for a few minutes each feeding.  Pt reports breast reduction surgery 2 years ago. Surgical scaring noted to both breast. Assisted pt with position and latch. Max assistance needed for baby to latch to breast. Baby on/off, unable to grasp breast, nipple flattens.assisted with hand expression, colostrum not expressed.Discuss breast reduction surgery with pt and potential supplementation needed. Pt reports she the plastic surgeon said, "she should be able to breastfeed." She said she specifically told him she wants to have children and breastfeed.  Discuss supplementation if needed and use of breastpump for breast stimulation. Support given.   "

## 2023-08-22 NOTE — TRANSFER OF CARE
"Anesthesia Transfer of Care Note    Patient: Lorena Parrish    Procedure(s) Performed: * No procedures listed *    Patient location: Labor and Delivery    Anesthesia Type: epidural    Transport from OR: Transported from OR on room air with adequate spontaneous ventilation    Post pain: adequate analgesia    Post assessment: no apparent anesthetic complications    Post vital signs: stable    Level of consciousness: awake and alert    Nausea/Vomiting: no nausea/vomiting    Complications: none    Transfer of care protocol was followed      Last vitals:   Visit Vitals  /73   Pulse 90   Temp 36.8 °C (98.2 °F) (Oral)   Resp 16   Ht 5' 5" (1.651 m)   Wt 95.5 kg (210 lb 8.6 oz)   SpO2 98%   Breastfeeding No   BMI 35.04 kg/m²     "

## 2023-08-22 NOTE — PROGRESS NOTES
POSTPARTUM PROGRESS NOTE    Subjective:     PPD/POD#: 1   Procedure: Primary LTCS   EGA: 38w4d   N/V: No   F/C: No   Abd Pain: Mild, well-controlled with oral pain medication   Lochia: Mild   Voiding: Not since walton removed   Ambulating: Yes   Bowel fnc: No   Breastfeeding: Yes with difficulty due to breast reduction    Contraception: Per primary OB   Circumcision: N/A, female     Objective:      Temp:  [97.8 °F (36.6 °C)-98.6 °F (37 °C)] 98.4 °F (36.9 °C)  Pulse:  [] 76  Resp:  [17-18] 17  SpO2:  [76 %-100 %] 98 %  BP: (104-128)/(54-82) 105/54    Lung: Normal respiratory effort   Abdomen: Soft, appropriately tender   Uterus: Firm, no fundal tenderness   Incision: Bandage in place without shadowing   : Deferred   Extremities: Bilateral 2+LE edema, NT     Lab Review    Recent Labs   Lab 23  0114      K 3.8      CO2 17*   BUN 15   CREATININE 0.6   *   PROT 6.4   BILITOT 0.2   ALKPHOS 172*   ALT 8*   AST 10       Recent Labs   Lab 23  0114 23  0516   WBC 8.54 14.78*   HGB 8.9* 7.1*   HCT 28.6* 22.8*   MCV 78* 78*    237         I/O    Intake/Output Summary (Last 24 hours) at 2023 1225  Last data filed at 2023 0545  Gross per 24 hour   Intake 2530.19 ml   Output 2140 ml   Net 390.19 ml        Assessment and Plan:   Postpartum care:  - Patient doing well.  - Continue routine management and advances.    Gestational Diabetes  - Fasting B  - Will need 2 hour gtt at postpartum visit     Elevated BP x1  - Does not meet criteria for GHTN  - Continue to monitor Bps    Anemia chronic and acute blood loss  - H/H as above  - QBL: 665mL  - asymptomatic  - iron/colace     Stephanie Villarreal MD,MARTIR  Date and Time: 2023 12:25 PM  OB Hospitalist

## 2023-08-23 ENCOUNTER — PATIENT MESSAGE (OUTPATIENT)
Dept: LACTATION | Facility: CLINIC | Age: 28
End: 2023-08-23
Payer: COMMERCIAL

## 2023-08-23 LAB
BASOPHILS # BLD AUTO: 0.02 K/UL (ref 0–0.2)
BASOPHILS NFR BLD: 0.2 % (ref 0–1.9)
BLD PROD TYP BPU: NORMAL
BLD PROD TYP BPU: NORMAL
BLOOD UNIT EXPIRATION DATE: NORMAL
BLOOD UNIT EXPIRATION DATE: NORMAL
BLOOD UNIT TYPE CODE: 5100
BLOOD UNIT TYPE CODE: 5100
BLOOD UNIT TYPE: NORMAL
BLOOD UNIT TYPE: NORMAL
CODING SYSTEM: NORMAL
CODING SYSTEM: NORMAL
CROSSMATCH INTERPRETATION: NORMAL
CROSSMATCH INTERPRETATION: NORMAL
DIFFERENTIAL METHOD: ABNORMAL
DISPENSE STATUS: NORMAL
DISPENSE STATUS: NORMAL
EOSINOPHIL # BLD AUTO: 0.1 K/UL (ref 0–0.5)
EOSINOPHIL NFR BLD: 0.7 % (ref 0–8)
ERYTHROCYTE [DISTWIDTH] IN BLOOD BY AUTOMATED COUNT: 16.2 % (ref 11.5–14.5)
HCT VFR BLD AUTO: 22.5 % (ref 37–48.5)
HGB BLD-MCNC: 6.8 G/DL (ref 12–16)
IMM GRANULOCYTES # BLD AUTO: 0.08 K/UL (ref 0–0.04)
IMM GRANULOCYTES NFR BLD AUTO: 0.9 % (ref 0–0.5)
LYMPHOCYTES # BLD AUTO: 1.4 K/UL (ref 1–4.8)
LYMPHOCYTES NFR BLD: 15 % (ref 18–48)
MCH RBC QN AUTO: 23.8 PG (ref 27–31)
MCHC RBC AUTO-ENTMCNC: 30.2 G/DL (ref 32–36)
MCV RBC AUTO: 79 FL (ref 82–98)
MONOCYTES # BLD AUTO: 0.8 K/UL (ref 0.3–1)
MONOCYTES NFR BLD: 8.5 % (ref 4–15)
NEUTROPHILS # BLD AUTO: 6.8 K/UL (ref 1.8–7.7)
NEUTROPHILS NFR BLD: 74.7 % (ref 38–73)
NRBC BLD-RTO: 0 /100 WBC
NUM UNITS TRANS PACKED RBC: NORMAL
PLATELET # BLD AUTO: 249 K/UL (ref 150–450)
PMV BLD AUTO: 10.2 FL (ref 9.2–12.9)
POCT GLUCOSE: 79 MG/DL (ref 70–110)
RBC # BLD AUTO: 2.86 M/UL (ref 4–5.4)
TRANS ERYTHROCYTES VOL PATIENT: NORMAL ML
WBC # BLD AUTO: 9.03 K/UL (ref 3.9–12.7)

## 2023-08-23 PROCEDURE — P9021 RED BLOOD CELLS UNIT: HCPCS | Performed by: STUDENT IN AN ORGANIZED HEALTH CARE EDUCATION/TRAINING PROGRAM

## 2023-08-23 PROCEDURE — 25000003 PHARM REV CODE 250

## 2023-08-23 PROCEDURE — 11000001 HC ACUTE MED/SURG PRIVATE ROOM

## 2023-08-23 PROCEDURE — 36430 TRANSFUSION BLD/BLD COMPNT: CPT

## 2023-08-23 PROCEDURE — 25000003 PHARM REV CODE 250: Performed by: OBSTETRICS & GYNECOLOGY

## 2023-08-23 PROCEDURE — 36415 COLL VENOUS BLD VENIPUNCTURE: CPT | Performed by: OBSTETRICS & GYNECOLOGY

## 2023-08-23 PROCEDURE — 86985 SPLIT BLOOD OR PRODUCTS: CPT | Performed by: STUDENT IN AN ORGANIZED HEALTH CARE EDUCATION/TRAINING PROGRAM

## 2023-08-23 PROCEDURE — 85025 COMPLETE CBC W/AUTO DIFF WBC: CPT | Performed by: OBSTETRICS & GYNECOLOGY

## 2023-08-23 PROCEDURE — P9011 BLOOD SPLIT UNIT: HCPCS | Performed by: STUDENT IN AN ORGANIZED HEALTH CARE EDUCATION/TRAINING PROGRAM

## 2023-08-23 RX ORDER — HYDROCODONE BITARTRATE AND ACETAMINOPHEN 500; 5 MG/1; MG/1
TABLET ORAL
Status: DISCONTINUED | OUTPATIENT
Start: 2023-08-23 | End: 2023-08-24 | Stop reason: HOSPADM

## 2023-08-23 RX ORDER — LANOLIN ALCOHOL/MO/W.PET/CERES
1 CREAM (GRAM) TOPICAL DAILY
Status: DISCONTINUED | OUTPATIENT
Start: 2023-08-23 | End: 2023-08-24 | Stop reason: HOSPADM

## 2023-08-23 RX ADMIN — IBUPROFEN 800 MG: 400 TABLET ORAL at 01:08

## 2023-08-23 RX ADMIN — SIMETHICONE 80 MG: 80 TABLET, CHEWABLE ORAL at 09:08

## 2023-08-23 RX ADMIN — VENLAFAXINE 75 MG: 37.5 TABLET ORAL at 09:08

## 2023-08-23 RX ADMIN — IBUPROFEN 800 MG: 400 TABLET ORAL at 05:08

## 2023-08-23 RX ADMIN — ACETAMINOPHEN 650 MG: 325 TABLET, FILM COATED ORAL at 06:08

## 2023-08-23 RX ADMIN — DOCUSATE SODIUM 200 MG: 100 CAPSULE, LIQUID FILLED ORAL at 09:08

## 2023-08-23 RX ADMIN — ACETAMINOPHEN 650 MG: 325 TABLET, FILM COATED ORAL at 11:08

## 2023-08-23 RX ADMIN — IBUPROFEN 800 MG: 400 TABLET ORAL at 09:08

## 2023-08-23 RX ADMIN — FERROUS SULFATE TAB 325 MG (65 MG ELEMENTAL FE) 1 EACH: 325 (65 FE) TAB at 01:08

## 2023-08-23 RX ADMIN — ACETAMINOPHEN 650 MG: 325 TABLET, FILM COATED ORAL at 05:08

## 2023-08-23 RX ADMIN — OXYCODONE HYDROCHLORIDE AND ACETAMINOPHEN 1 TABLET: 5; 325 TABLET ORAL at 09:08

## 2023-08-23 NOTE — PLAN OF CARE
Patient safety maintained, side rails up, bed low and locked position. Pt ambulating and voiding independently.  Pain well controlled with PRN pain medication. Fundus midline, firm, with light lochia. Patient responding to infant cues.  Incision site clean, dry, and intact. Pt receiving 2 units of PRBCs for low H&H. Significant other at bedside and assisting in patient's care. Will continue to monitor.

## 2023-08-23 NOTE — PROGRESS NOTES
POSTPARTUM PROGRESS NOTE    Subjective:     PPD/POD#: 2   Procedure: Primary LTCS   EGA: 38w4d   N/V: No   F/C: No   Abd Pain: Mild, well-controlled with oral pain medication   Lochia: Mild   Voiding: Yes   Ambulating: Yes   Bowel fnc: Yes   Breastfeeding: Yes    Contraception: Per primary OB   Circumcision: N/A, female     Objective:      Temp:  [98.2 °F (36.8 °C)] 98.2 °F (36.8 °C)  Pulse:  [76-85] 79  Resp:  [16-18] 18  SpO2:  [96 %-97 %] 96 %  BP: ()/(50-60) 111/60    Lung: Normal respiratory effort   Abdomen: Soft, appropriately tender   Uterus: Firm, no fundal tenderness   Incision: Bandage in place without shadowing   : Deferred   Extremities: Bilateral 2+LE edema, NT     Lab Review    Recent Labs   Lab 23  0114      K 3.8      CO2 17*   BUN 15   CREATININE 0.6   *   PROT 6.4   BILITOT 0.2   ALKPHOS 172*   ALT 8*   AST 10       Recent Labs   Lab 23  0114 23  0516   WBC 8.54 14.78*   HGB 8.9* 7.1*   HCT 28.6* 22.8*   MCV 78* 78*    237     I/O    Intake/Output Summary (Last 24 hours) at 2023 0914  Last data filed at 2023 1630  Gross per 24 hour   Intake --   Output 1100 ml   Net -1100 ml     Assessment and Plan:   Postpartum care:  - Patient doing well.  - Continue routine management and advances.    Gestational Diabetes  - Fasting B  - Will need 2 hour gtt at postpartum visit     Anemia chronic and acute blood loss  - H/H as above  - QBL: 665mL  - asymptomatic  - iron/colace     Aldo Fernandez MD MS  OB/Gyn  PGY-1

## 2023-08-23 NOTE — LACTATION NOTE
"Situation: continued lactation support    Background: day 2 postpartum, reports overwhelm with feeding not going as planned, did not attempt to latch infant overnight, offering bottle with donor breast milk, reports some leaking     Assessment:   Pt reports overwhelm of feeling when pumping "it's a flood of emotion." Most intense in first 2 minutes and end. Discussed DMER, typical time course and comfort measures including drinking iced water, hard candy and distraction.     Pt reports sadness with feeding not going as planned, desires to continue latch attempts but lacking confidence. Provided assistance to position and latch in football, infant on/off breast but eventually sustains sucks x 2 minutes. Pt reports this is longest and strongest feed she has experienced. After 10 minutes pt requests to stop latch attempt.     LC fed infant 5mL via paced bottle with purple preemie nipple, comfortable during feed. Mom completes feeding, total 25mL.     Educated on supplemental nursing system including R/B/A, pt declines at this time. Educated on hospital grade rental pump, pt desires to rent, paperwork provided.    Actions:   1.  Reviewed basics of breastfeeding, deep latch technique and building milk supply when infant not consistently latching.  2. Encouraged scheduled pumping sessions, at least 8x/24 hours with no longer than 5 hour stretch without pumping  3. Reviewed paced bottle feeding and monitoring infant for hunger and fullness cues  4. Support and encouragement provided to meet patient goals, empathetic listening provided.      Results: Pt agrees to the plan of feeding. LC number on board, pt to call as needed for assistance. V/U and questions answered.       08/23/23 1010   Maternal Assessment   Breast Shape round   Breast Density soft   Areola elastic;surgical scarring   Nipples flat   Left Nipple Symptoms other (see comments)  (small abrasion at base of nipple d/t too large flange size)   Maternal Infant " Feeding   Maternal Emotional State anxious;assist needed   Infant Positioning clutch/football   Signs of Milk Transfer infant jaw motion present   Pain with Feeding no   Nipple Shape After Feeding, Left slightly longer   Latch Assistance yes   Equipment Type   Breast Pump Type   (pt just completed pumping at 0910)   Breast Pumping   Breast Pumping Interventions frequent pumping encouraged;post-feed pumping encouraged   Community Referrals   Community Referrals outpatient lactation program;support group

## 2023-08-23 NOTE — PLAN OF CARE
Based on pt's stated feeding goals plan of care is for pt to bring infant to breast with all hunger cues but at least 8x/24 hours, after 10-15 minutes of attempt, offer EBM/donor breast milk via paced bottle feeding to infant to meet demands, while pt pumps bilaterally x 15-20 minutes followed by hand expression, pt to do skin-to-skin, to observe for signs of effective milk transfer, to monitor voids and stools, and to call for assistance.

## 2023-08-24 VITALS
RESPIRATION RATE: 18 BRPM | BODY MASS INDEX: 35.08 KG/M2 | OXYGEN SATURATION: 97 % | HEART RATE: 78 BPM | WEIGHT: 210.56 LBS | SYSTOLIC BLOOD PRESSURE: 126 MMHG | HEIGHT: 65 IN | DIASTOLIC BLOOD PRESSURE: 81 MMHG | TEMPERATURE: 98 F

## 2023-08-24 LAB
BASOPHILS # BLD AUTO: 0.02 K/UL (ref 0–0.2)
BASOPHILS NFR BLD: 0.2 % (ref 0–1.9)
DIFFERENTIAL METHOD: ABNORMAL
EOSINOPHIL # BLD AUTO: 0.1 K/UL (ref 0–0.5)
EOSINOPHIL NFR BLD: 1.5 % (ref 0–8)
ERYTHROCYTE [DISTWIDTH] IN BLOOD BY AUTOMATED COUNT: 16 % (ref 11.5–14.5)
HCT VFR BLD AUTO: 25.6 % (ref 37–48.5)
HGB BLD-MCNC: 8 G/DL (ref 12–16)
IMM GRANULOCYTES # BLD AUTO: 0.13 K/UL (ref 0–0.04)
IMM GRANULOCYTES NFR BLD AUTO: 1.5 % (ref 0–0.5)
LYMPHOCYTES # BLD AUTO: 1.9 K/UL (ref 1–4.8)
LYMPHOCYTES NFR BLD: 22.1 % (ref 18–48)
MCH RBC QN AUTO: 25.2 PG (ref 27–31)
MCHC RBC AUTO-ENTMCNC: 31.3 G/DL (ref 32–36)
MCV RBC AUTO: 81 FL (ref 82–98)
MONOCYTES # BLD AUTO: 0.6 K/UL (ref 0.3–1)
MONOCYTES NFR BLD: 6.8 % (ref 4–15)
NEUTROPHILS # BLD AUTO: 5.8 K/UL (ref 1.8–7.7)
NEUTROPHILS NFR BLD: 67.9 % (ref 38–73)
NRBC BLD-RTO: 0 /100 WBC
PLATELET # BLD AUTO: 241 K/UL (ref 150–450)
PMV BLD AUTO: 9.8 FL (ref 9.2–12.9)
RBC # BLD AUTO: 3.18 M/UL (ref 4–5.4)
WBC # BLD AUTO: 8.58 K/UL (ref 3.9–12.7)

## 2023-08-24 PROCEDURE — 25000003 PHARM REV CODE 250: Performed by: OBSTETRICS & GYNECOLOGY

## 2023-08-24 PROCEDURE — 25000003 PHARM REV CODE 250

## 2023-08-24 PROCEDURE — 85025 COMPLETE CBC W/AUTO DIFF WBC: CPT

## 2023-08-24 PROCEDURE — 36415 COLL VENOUS BLD VENIPUNCTURE: CPT

## 2023-08-24 RX ORDER — OXYCODONE AND ACETAMINOPHEN 5; 325 MG/1; MG/1
1 TABLET ORAL EVERY 4 HOURS PRN
Qty: 5 TABLET | Refills: 0 | Status: SHIPPED | OUTPATIENT
Start: 2023-08-24 | End: 2023-09-20

## 2023-08-24 RX ORDER — IBUPROFEN 600 MG/1
600 TABLET ORAL EVERY 6 HOURS PRN
Qty: 30 TABLET | Refills: 0 | Status: SHIPPED | OUTPATIENT
Start: 2023-08-24 | End: 2023-09-20

## 2023-08-24 RX ORDER — OXYCODONE AND ACETAMINOPHEN 5; 325 MG/1; MG/1
1 TABLET ORAL EVERY 4 HOURS PRN
Qty: 5 TABLET | Refills: 0 | Status: SHIPPED | OUTPATIENT
Start: 2023-08-24 | End: 2023-08-24 | Stop reason: SDUPTHER

## 2023-08-24 RX ORDER — DOCUSATE SODIUM 100 MG/1
100 CAPSULE, LIQUID FILLED ORAL 2 TIMES DAILY
Qty: 30 CAPSULE | Refills: 0 | Status: SHIPPED | OUTPATIENT
Start: 2023-08-24 | End: 2023-09-20

## 2023-08-24 RX ORDER — FERROUS SULFATE 324(65)MG
325 TABLET, DELAYED RELEASE (ENTERIC COATED) ORAL 2 TIMES DAILY
Qty: 30 TABLET | Refills: 0 | Status: SHIPPED | OUTPATIENT
Start: 2023-08-24 | End: 2023-09-20

## 2023-08-24 RX ADMIN — ACETAMINOPHEN 650 MG: 325 TABLET, FILM COATED ORAL at 11:08

## 2023-08-24 RX ADMIN — ACETAMINOPHEN 650 MG: 325 TABLET, FILM COATED ORAL at 01:08

## 2023-08-24 RX ADMIN — VENLAFAXINE 75 MG: 37.5 TABLET ORAL at 08:08

## 2023-08-24 RX ADMIN — DOCUSATE SODIUM 200 MG: 100 CAPSULE, LIQUID FILLED ORAL at 08:08

## 2023-08-24 RX ADMIN — IBUPROFEN 800 MG: 400 TABLET ORAL at 05:08

## 2023-08-24 RX ADMIN — IBUPROFEN 800 MG: 400 TABLET ORAL at 11:08

## 2023-08-24 RX ADMIN — FERROUS SULFATE TAB 325 MG (65 MG ELEMENTAL FE) 1 EACH: 325 (65 FE) TAB at 08:08

## 2023-08-24 NOTE — PLAN OF CARE
Patient in no apparent distress. VSS. Ambulating without difficulty. No needs at this time. Discharge papers signed. Mother Baby care guide reviewed. All questions answered.

## 2023-08-24 NOTE — PROCEDURES
Certification of Assistant at Surgery       Surgery Date: 2023     Participating Surgeons:  Surgeon(s) and Role:     * Yulissa Westfall MD - Primary     * Karen Guajardo MD - Assisting    Procedures:  Procedure(s) (LRB):   SECTION (N/A)    Assistant Surgeon's Certification of Necessity:  I understand that section 1842 (b) (6) (d) of the Social Security Act generally prohibits Medicare Part B reasonable charge payment for the services of assistants at surgery in [facility] hospitals when qualified residents are available to furnish such services. I certify that the services for which payment is claimed were medically necessary, and that no qualified resident was available to perform the services. I further understand that these services are subject to post-payment review by the Medicare carrier.      Karen Guajardo MD    2023  12:28 PM

## 2023-08-24 NOTE — DISCHARGE SUMMARY
Delivery Discharge Summary  Obstetrics      Primary OB Clinician: Dr. Westfall    Admission date: 2023  Discharge date: 2023    Disposition: To home, self care    Admit Dx:      Patient Active Problem List   Diagnosis    Patellar instability of left knee    ADHD (attention deficit hyperactivity disorder)    Depression    Gastroesophageal reflux disease without esophagitis    H/O gastric sleeve     delivery delivered     Discharge Dx:    Patient Active Problem List   Diagnosis    Patellar instability of left knee    ADHD (attention deficit hyperactivity disorder)    Depression    Gastroesophageal reflux disease without esophagitis    H/O gastric sleeve     delivery delivered       Procedure: pLTCS    Hospital Course:  Lorena Parrish is a 28 y.o. now , POD #3 who was admitted on 2023 for IOL. On initial assessment, vital signs were stable and physical exam was normal. Infant was in cephalic presentation. Patient was subsequently admitted to labor and delivery unit with signed consents.   Due to non reassuring fetal heart tracing during labor, decision was made to precede with a pLTCS.   Please see delivery note for further details. Pt was in stable condition post delivery and was transferred to the Mother-Baby Unit. Her postpartum course was complicated by symptomatic anemia and patient received 2 units of pRBC, resolving her symptoms. On discharge day, patient's pain is controlled with oral pain medications. Pt is tolerating ambulation without SOB or CP, and PO diet without N/V. Reports lochia is mild. Denies any HA, vision changes, F/C, LE swelling. Denies any breast pain/soreness.  Pt in stable condition and ready for discharge. She has been instructed to continue  pain medications as needed and to follow up in the OB clinic in 6 weeks with her obstetrics provider.      Temp:  [97.8 °F (36.6 °C)-98.9 °F (37.2 °C)] 98.3 °F (36.8 °C)  Pulse:  [68-90] 78  Resp:  [16-18]  "18  SpO2:  [96 %-98 %] 97 %  BP: (113-126)/(56-81) 126/81    PE  Lung: Normal respiratory effort   Abdomen: Soft, appropriately tender   Uterus: Firm, no fundal tenderness   Incision: Bandage in place without shadowing   : Deferred             Pertinent studies:  Postpartum CBC  Lab Results   Component Value Date    WBC 8.58 2023    HGB 8.0 (L) 2023    HCT 25.6 (L) 2023    MCV 81 (L) 2023     2023         Tubal Ligation: n/a  Feeding Method: breast  Rh Immune Globulin Given(O POS): N/A  Rubella Vaccine Given: N/A  Tdap Vaccine Given: N/A    Delivery:    Episiotomy:     Lacerations:     Repair suture:     Repair # of packets:     Blood loss (ml):       Birth information:  YOB: 2022   Time of birth: 7:25 PM   Sex: female   Delivery type: , Low Transverse   Gestational Age: 38w4d     Measurements    Weight: 3240 g  Weight (lbs): 7 lb 2.3 oz  Length: 49.5 cm  Length (in): 19.5"  Head circumference: 34.3 cm  Chest circumference: 34.9 cm         Delivery Clinician: Delivery Providers    Delivering clinician: Yulissa Westfall MD   Provider Role    Karen Guajardo MD [facility] Hospitalist    Danae Clarke RN Circulator    Marielos Andrews  Surgical Tech    Miriam Fu RN Registered Nurse    Luanne Babb RN Registered Nurse             Additional  information:  Forceps:    Vacuum:    Breech:    Observed anomalies      Living?:     Apgars    Living status: Living  Apgar Component Scores:  1 min.:  5 min.:  10 min.:  15 min.:  20 min.:    Skin color:  0  1       Heart rate:  2  2       Reflex irritability:  2  2       Muscle tone:  2  2       Respiratory effort:  1  2       Total:  7  9       Apgars assigned by: RAMIRO BABB RN         Placenta: Delivered:       appearance  Patient Instructions:   Current Discharge Medication List        START taking these medications    Details   docusate sodium (COLACE) 100 MG capsule Take 1 capsule (100 mg total) " by mouth 2 (two) times daily.  Qty: 30 capsule, Refills: 0      ferrous sulfate 324 mg (65 mg iron) TbEC Take 1 tablet (324 mg total) by mouth 2 (two) times daily.  Qty: 30 tablet, Refills: 0      ibuprofen (ADVIL,MOTRIN) 600 MG tablet Take 1 tablet (600 mg total) by mouth every 6 (six) hours as needed for Pain.  Qty: 30 tablet, Refills: 0      oxyCODONE-acetaminophen (PERCOCET) 5-325 mg per tablet Take 1 tablet by mouth every 4 (four) hours as needed for Pain.  Qty: 5 tablet, Refills: 0    Comments: Quantity prescribed more than 7 day supply? No           CONTINUE these medications which have NOT CHANGED    Details   pantoprazole (PROTONIX) 40 MG tablet       venlafaxine (EFFEXOR) 75 MG tablet Take 75 mg by mouth.      venlafaxine (EFFEXOR-XR) 75 MG 24 hr capsule Take 75 mg by mouth once daily.           STOP taking these medications       blood sugar diagnostic Strp Comments:   Reason for Stopping:         blood-glucose meter kit Comments:   Reason for Stopping:         CONTOUR NEXT METER Misc Comments:   Reason for Stopping:         folic acid (FOLVITE) 400 MCG tablet Comments:   Reason for Stopping:         metFORMIN (GLUCOPHAGE) 500 MG tablet Comments:   Reason for Stopping:         prenatal vit/iron fum/folic ac (PRENATAL 1+1 ORAL) Comments:   Reason for Stopping:               Discharge Procedure Orders   Diet Adult Regular     No driving until:   Order Comments: No driving until not taking narcotic pain medication.     Pelvic Rest   Order Comments: Pelvic rest until 6 weeks after discharge. Nothing in vagina -no sex, tampons, douching, etc.     Notify your health care provider if you experience any of the following:  temperature >100.4     Notify your health care provider if you experience any of the following:  persistent nausea and vomiting or diarrhea     Notify your health care provider if you experience any of the following:  severe uncontrolled pain     Notify your health care provider if you  experience any of the following:  redness, tenderness, or signs of infection (pain, swelling, redness, odor or green/yellow discharge around incision site)     Notify your health care provider if you experience any of the following:  difficulty breathing or increased cough     Notify your health care provider if you experience any of the following:  severe persistent headache     Notify your health care provider if you experience any of the following:  worsening rash     Notify your health care provider if you experience any of the following:  persistent dizziness, light-headedness, or visual disturbances     Notify your health care provider if you experience any of the following:  increased confusion or weakness     Notify your health care provider if you experience any of the following:   Order Comments: Heavy vaginal bleeding saturating more than 1 pad per hr for at least consecutive 2 hrs.     Activity as tolerated       Juan M Ramirez MD  Obstetrics and Gynecology- PGY1

## 2023-08-24 NOTE — LACTATION NOTE
08/24/23 1115   Equipment Type   Breast Pump Type double electric, hospital grade   Breast Pump Flange Type hard   Breast Pump Flange Size 24 mm   Breast Pumping   Breast Pumping Interventions frequent pumping encouraged;post-feed pumping encouraged   Breast Pumping double electric breast pump utilized;pre-pumping hand expression;pre-pumping breast massage;pre-pumping tactile stimulation     LC to room: client called after pumping session. Client and family excited, client pumped 12 ml total. Praise given. Milk collection, labeling, storage and transport reviewed.  Discharge education provided utilizing Breastfeeding guide handout. Feeding on cue 8 or more times in 24 hours reviewed, frequency and duration reviewed, intake amount and diaper counts expected on current day of life reviewed, engorgement prevention and relief measures reviewed. Pump information reviewed, copy of pump rental papers given, client renting for 1 month. Community resources, risk hotline, and warmline extension provided. Extension on whiteboard, all questions answered, client and FOB and family verbalized understanding.

## 2023-08-24 NOTE — PLAN OF CARE
08/24/23 0927   Final Note   Assessment Type Final Discharge Note   Anticipated Discharge Disposition Home   Post-Acute Status   Discharge Delays None known at this time

## 2023-08-24 NOTE — LACTATION NOTE
08/24/23 0900   Maternal Assessment   Breast Shape Bilateral:;round   Breast Density Bilateral:;filling   Areola Bilateral:;elastic;surgical scarring   Nipples Bilateral:;flat   Left Nipple Symptoms abraded  (due to pump, lanolin and education provided)   Right Nipple Symptoms abraded  (due to pump, lanolin and education provided)   Maternal Infant Feeding   Maternal Emotional State anxious;assist needed   Infant Positioning cross-cradle   Signs of Milk Transfer infant jaw motion present   Pain with Feeding no   Comfort Measures Before/During Feeding latch adjusted;infant position adjusted;maternal position adjusted;other (see comments)  (nipple shield utilized (medium size), assist and education provided)   Comfort Measures Following Feeding air-drying encouraged;soap use discouraged;water cleansing encouraged  (lanolin use encouraged)   Nipple Shape After Feeding, Left round   Nipple Shape After Feeding, Right round   Latch Assistance yes   Additional Documentation Breastfeeding Supplementation (Group)   Breastfeeding Supplementation   Breastfeeding Supplementation Type donor breast milk   Method of Supplementation paced bottle   Infant Indication for Supplementation maternal request   Nipple Used For Supplementation slow flow   Equipment Type   Breast Pump Type double electric, hospital grade   Breast Pump Flange Type hard   Breast Pump Flange Size 24 mm   Breast Pumping   Breast Pumping Interventions frequent pumping encouraged;post-feed pumping encouraged   Breast Pumping pre-pumping breast massage;pre-pumping tactile stimulation;double electric breast pump utilized   Community Referrals   Community Referrals outpatient lactation program;support group;pediatric care provider     LC to room: LC offered latch assist, client accepted. LC assessed nipple damage - client stated abrasions were from the pump - LC recommended use of lanolin inside flanges and reminded client to center both nipples into flanges. DIY  pumping bra instructions provided and hands-on pumping method reviewed. LC assisted with latching infant, nipples are flat, infant increasing frustration at breast, LC provided nipple shield with instructions. Infant able to latch easily to shield and pulls nipple into shield. Client stated latch feels comfortable and tugging. Colostrum noted inside shield bilaterally post-feedings. Praise given to client. Family supplemented infant with donor milk via paced bottle. LC encouraged client to massage breasts in shower, then apply lanolin to flanges, then pump and call LC for remainder of discharge education. Extension on whiteboard, client verbalized understanding, all questions answered.

## 2023-08-25 ENCOUNTER — PATIENT MESSAGE (OUTPATIENT)
Dept: OBSTETRICS AND GYNECOLOGY | Facility: OTHER | Age: 28
End: 2023-08-25
Payer: COMMERCIAL

## 2023-08-28 ENCOUNTER — PATIENT MESSAGE (OUTPATIENT)
Dept: OBSTETRICS AND GYNECOLOGY | Facility: CLINIC | Age: 28
End: 2023-08-28
Payer: COMMERCIAL

## 2023-08-29 ENCOUNTER — PATIENT MESSAGE (OUTPATIENT)
Dept: OBSTETRICS AND GYNECOLOGY | Facility: CLINIC | Age: 28
End: 2023-08-29

## 2023-08-29 ENCOUNTER — POSTPARTUM VISIT (OUTPATIENT)
Dept: OBSTETRICS AND GYNECOLOGY | Facility: CLINIC | Age: 28
End: 2023-08-29
Payer: COMMERCIAL

## 2023-08-29 VITALS — HEIGHT: 65 IN | BODY MASS INDEX: 32.87 KG/M2 | WEIGHT: 197.31 LBS

## 2023-08-29 PROCEDURE — 0503F PR POSTPARTUM CARE VISIT: ICD-10-PCS | Mod: CPTII,S$GLB,,

## 2023-08-29 PROCEDURE — 99999 PR PBB SHADOW E&M-EST. PATIENT-LVL III: CPT | Mod: PBBFAC,,,

## 2023-08-29 PROCEDURE — 0503F POSTPARTUM CARE VISIT: CPT | Mod: CPTII,S$GLB,,

## 2023-08-29 PROCEDURE — 99999 PR PBB SHADOW E&M-EST. PATIENT-LVL III: ICD-10-PCS | Mod: PBBFAC,,,

## 2023-08-30 NOTE — PROGRESS NOTES
"CC:  Postpartum Visit,  Post C/S      Subjective:     Lorena Parrish is a 28 y.o.  who presents for a 1 week BP check postpartum visit after a  delivery secondary to non-reassuring fetal heart tones. This IUP was complicated by gestational diabetes (fair control with oral anti-hypoglycemic). Today she denies nausea or vomiting. Pain has been well controlled. Denies redness or drainage from the incision. Bleeding minimal.  No HA's, vision changes, chest pain, SOB, or increased swelling.  Pumping without any issues. Reports baby is doing well overall.     PPDS: 18.  Pt reports emotional irritability and moodiness.  Endorses significant fatigue.  Also reports increased emotional response to pumping.  Every time she starts to pump she feels an overwhelming doom and anxiety and can't help but to cry.  Reports hx of anxiety/depression.  Currently on Effexor 75 mg daily.  Does not have a therapist.  Reports significant other is very supportive and helpful.  Also has help from mom and mother in law.  Denies HI/SI.     Review the Delivery Report for details.     Objective:     Vitals:  Ht 5' 5" (1.651 m)   Wt 89.5 kg (197 lb 5 oz)   Breastfeeding Yes   BMI 32.83 kg/m²     General:   Alert and cooperative, emotional, tearful   Abdomen:  Abdomen is soft, non distended, non-tender.    Incision:  Clean, healing well. No erythema or drainage.     Skin:  Warm and dry, bilateral trace edema.      Assessment/Plan:     Postpartum care and examination  - S/p  delivery - 1 week post-op   - BP today 126/81, WNL.    - Incision looks good, no s/s of infection, healing appropriately.  - Baby overall doing well  - Patient endorses pumping and feeding without difficulty  - Minimal bleeding  - No intercourse since delivery  - Continue pelvic rest until 6 weeks PP visit  - Currently using abstinence for contraception    Postpartum depression  - Reassurance given to patient.  Pt denies HI/SI.  Good support at " home and pt reports she believes she is able to cope appropriately at this time.  - Discussed therapy and reaching out to get established.  Will give referrals per portal message.  - Plan for pt to reach out to therapy.  Will follow up for mood check in a few days and increase Effexor if needed.  - Strict precautions given if development of HI/SI symptoms.  Pt verbalized understanding.    Follow up for mood check in a few days.    Domitila Del Cid (Maggie), ROCKY  Obstetrics and Gynecology  [facility]

## 2023-09-01 ENCOUNTER — OFFICE VISIT (OUTPATIENT)
Dept: OBSTETRICS AND GYNECOLOGY | Facility: CLINIC | Age: 28
End: 2023-09-01
Payer: COMMERCIAL

## 2023-09-01 DIAGNOSIS — F41.8 POSTPARTUM ANXIETY: ICD-10-CM

## 2023-09-01 DIAGNOSIS — N61.0 ACUTE MASTITIS OF LEFT BREAST: Primary | ICD-10-CM

## 2023-09-01 PROCEDURE — 1160F PR REVIEW ALL MEDS BY PRESCRIBER/CLIN PHARMACIST DOCUMENTED: ICD-10-PCS | Mod: CPTII,95,, | Performed by: NURSE PRACTITIONER

## 2023-09-01 PROCEDURE — 0502F PR SUBSEQUENT PRENATAL CARE: ICD-10-PCS | Mod: CPTII,95,, | Performed by: NURSE PRACTITIONER

## 2023-09-01 PROCEDURE — 1159F PR MEDICATION LIST DOCUMENTED IN MEDICAL RECORD: ICD-10-PCS | Mod: CPTII,95,, | Performed by: NURSE PRACTITIONER

## 2023-09-01 PROCEDURE — 1159F MED LIST DOCD IN RCRD: CPT | Mod: CPTII,95,, | Performed by: NURSE PRACTITIONER

## 2023-09-01 PROCEDURE — 1160F RVW MEDS BY RX/DR IN RCRD: CPT | Mod: CPTII,95,, | Performed by: NURSE PRACTITIONER

## 2023-09-01 PROCEDURE — 0502F SUBSEQUENT PRENATAL CARE: CPT | Mod: CPTII,95,, | Performed by: NURSE PRACTITIONER

## 2023-09-01 RX ORDER — DICLOXACILLIN SODIUM 500 MG/1
500 CAPSULE ORAL EVERY 6 HOURS
Qty: 40 CAPSULE | Refills: 0 | Status: SHIPPED | OUTPATIENT
Start: 2023-09-01 | End: 2023-09-11

## 2023-09-18 ENCOUNTER — PATIENT MESSAGE (OUTPATIENT)
Dept: LACTATION | Facility: CLINIC | Age: 28
End: 2023-09-18
Payer: COMMERCIAL

## 2023-09-20 ENCOUNTER — TELEPHONE (OUTPATIENT)
Dept: OBSTETRICS AND GYNECOLOGY | Facility: CLINIC | Age: 28
End: 2023-09-20

## 2023-09-20 ENCOUNTER — POSTPARTUM VISIT (OUTPATIENT)
Dept: OBSTETRICS AND GYNECOLOGY | Facility: CLINIC | Age: 28
End: 2023-09-20
Payer: COMMERCIAL

## 2023-09-20 VITALS
TEMPERATURE: 101 F | DIASTOLIC BLOOD PRESSURE: 88 MMHG | SYSTOLIC BLOOD PRESSURE: 126 MMHG | HEIGHT: 65 IN | BODY MASS INDEX: 30.89 KG/M2 | WEIGHT: 185.44 LBS

## 2023-09-20 DIAGNOSIS — R51.9 POSTPARTUM HEADACHE: Primary | ICD-10-CM

## 2023-09-20 PROCEDURE — 99212 PR OFFICE/OUTPT VISIT, EST, LEVL II, 10-19 MIN: ICD-10-PCS | Mod: 24,S$GLB,, | Performed by: STUDENT IN AN ORGANIZED HEALTH CARE EDUCATION/TRAINING PROGRAM

## 2023-09-20 PROCEDURE — 99999 PR PBB SHADOW E&M-EST. PATIENT-LVL III: CPT | Mod: PBBFAC,,, | Performed by: STUDENT IN AN ORGANIZED HEALTH CARE EDUCATION/TRAINING PROGRAM

## 2023-09-20 PROCEDURE — 99999 PR PBB SHADOW E&M-EST. PATIENT-LVL III: ICD-10-PCS | Mod: PBBFAC,,, | Performed by: STUDENT IN AN ORGANIZED HEALTH CARE EDUCATION/TRAINING PROGRAM

## 2023-09-20 PROCEDURE — 1111F PR DISCHARGE MEDS RECONCILED W/ CURRENT OUTPATIENT MED LIST: ICD-10-PCS | Mod: CPTII,S$GLB,, | Performed by: STUDENT IN AN ORGANIZED HEALTH CARE EDUCATION/TRAINING PROGRAM

## 2023-09-20 PROCEDURE — 99212 OFFICE O/P EST SF 10 MIN: CPT | Mod: 24,S$GLB,, | Performed by: STUDENT IN AN ORGANIZED HEALTH CARE EDUCATION/TRAINING PROGRAM

## 2023-09-20 PROCEDURE — 1111F DSCHRG MED/CURRENT MED MERGE: CPT | Mod: CPTII,S$GLB,, | Performed by: STUDENT IN AN ORGANIZED HEALTH CARE EDUCATION/TRAINING PROGRAM

## 2023-09-20 NOTE — TELEPHONE ENCOUNTER
S/p C/s on .    C/o severe HA for the last few days.  Has not taken meds.  Last night, pt had a syncope episode as she was preparing a bottle of milk for .  Right before she fainted, she felt nauseous and clammy.  Admits to not eating anything yesterday.  Pt feels fine now but still has a HA.  Does not have a BP device at home.  Not currently breastfeeding.    Advised to push fluids, advance diet as tolerated, take Tylenol or Ibuprofen.

## 2023-09-20 NOTE — PROGRESS NOTES
"Chief Complaint: Postpartum headache     HPI:      Lorena Parrish is a 28 y.o.  who presents today for blood pressure check after reporting a postpartum headache. Pt s/p  on , pregnancy complicated by GDM.  She reports she was feeling well when she went to sleep yesterday evening, then she woke up in the middle of the night feeling clammy and nauseated. She then developed a headache earlier this morning, which is when she called our office and made this appointment. She reports she then took her temperature at home and had a fever. Minor congestion, no other respiratory symptoms. Pt reports incision looks in tact without any pain or drainage. Denies any breast pain or tenderness. Reports normal bowel movements and denies abdominal pain. Denies any urinary symptoms, vaginal discharge or odor. No known sick contacts.     Physical Exam:      PHYSICAL EXAM:  /88   Temp (!) 100.9 °F (38.3 °C) (Oral)   Ht 5' 5" (1.651 m)   Wt 84.1 kg (185 lb 6.5 oz)   Breastfeeding No   BMI 30.85 kg/m²   Body mass index is 30.85 kg/m².     APPEARANCE: Well nourished, well developed, in no acute distress.  ABD: Pfann incision well healed with no signs of infection or defect. Soft, non-distended, non-tender. No fundal tenderness.  EXT: No edema.    Assessment/Plan:     Postpartum headache    Postpartum fever    - BP normotensive, Temp 100.9  - no obvious source of fever on exam; suspect may be COVID/flu/strep; recommend taking tylenol or ibuprofen  - recommend Urgent care visit for swabs to rule out COVID/flu/strep versus KRYSTIAN evaluation   - will follow up above workup    Juliana Palafox MD  Obstetrics and Gynecology  [facility]      "

## 2023-09-21 ENCOUNTER — HOSPITAL ENCOUNTER (EMERGENCY)
Facility: OTHER | Age: 28
Discharge: HOME | End: 2023-09-21
Attending: OBSTETRICS & GYNECOLOGY
Payer: COMMERCIAL

## 2023-09-21 VITALS
OXYGEN SATURATION: 97 % | HEART RATE: 61 BPM | TEMPERATURE: 98 F | RESPIRATION RATE: 19 BRPM | SYSTOLIC BLOOD PRESSURE: 125 MMHG | DIASTOLIC BLOOD PRESSURE: 86 MMHG

## 2023-09-21 DIAGNOSIS — R55 SYNCOPAL EPISODES: ICD-10-CM

## 2023-09-21 DIAGNOSIS — B34.9 VIRAL SYNDROME: Primary | ICD-10-CM

## 2023-09-21 DIAGNOSIS — Z98.891 STATUS POST CESAREAN SECTION: ICD-10-CM

## 2023-09-21 LAB
BASOPHILS # BLD AUTO: 0.04 K/UL (ref 0–0.2)
BASOPHILS NFR BLD: 0.5 % (ref 0–1.9)
DIFFERENTIAL METHOD: ABNORMAL
EOSINOPHIL # BLD AUTO: 0.1 K/UL (ref 0–0.5)
EOSINOPHIL NFR BLD: 0.9 % (ref 0–8)
ERYTHROCYTE [DISTWIDTH] IN BLOOD BY AUTOMATED COUNT: 17.2 % (ref 11.5–14.5)
GROUP A STREP, MOLECULAR: NEGATIVE
HCT VFR BLD AUTO: 33.4 % (ref 37–48.5)
HGB BLD-MCNC: 10.2 G/DL (ref 12–16)
IMM GRANULOCYTES # BLD AUTO: 0.03 K/UL (ref 0–0.04)
IMM GRANULOCYTES NFR BLD AUTO: 0.4 % (ref 0–0.5)
INFLUENZA A, MOLECULAR: NEGATIVE
INFLUENZA B, MOLECULAR: NEGATIVE
LYMPHOCYTES # BLD AUTO: 1.5 K/UL (ref 1–4.8)
LYMPHOCYTES NFR BLD: 19.9 % (ref 18–48)
MCH RBC QN AUTO: 24 PG (ref 27–31)
MCHC RBC AUTO-ENTMCNC: 30.5 G/DL (ref 32–36)
MCV RBC AUTO: 79 FL (ref 82–98)
MONOCYTES # BLD AUTO: 0.5 K/UL (ref 0.3–1)
MONOCYTES NFR BLD: 6.9 % (ref 4–15)
NEUTROPHILS # BLD AUTO: 5.5 K/UL (ref 1.8–7.7)
NEUTROPHILS NFR BLD: 71.4 % (ref 38–73)
NRBC BLD-RTO: 0 /100 WBC
PLATELET # BLD AUTO: 261 K/UL (ref 150–450)
PMV BLD AUTO: 10.1 FL (ref 9.2–12.9)
RBC # BLD AUTO: 4.25 M/UL (ref 4–5.4)
SARS-COV-2 RDRP RESP QL NAA+PROBE: NEGATIVE
SPECIMEN SOURCE: NORMAL
WBC # BLD AUTO: 7.72 K/UL (ref 3.9–12.7)

## 2023-09-21 PROCEDURE — 87502 INFLUENZA DNA AMP PROBE: CPT | Performed by: OBSTETRICS & GYNECOLOGY

## 2023-09-21 PROCEDURE — 93010 ELECTROCARDIOGRAM REPORT: CPT | Mod: ,,, | Performed by: INTERNAL MEDICINE

## 2023-09-21 PROCEDURE — U0002 COVID-19 LAB TEST NON-CDC: HCPCS | Performed by: OBSTETRICS & GYNECOLOGY

## 2023-09-21 PROCEDURE — 93005 ELECTROCARDIOGRAM TRACING: CPT

## 2023-09-21 PROCEDURE — 85025 COMPLETE CBC W/AUTO DIFF WBC: CPT

## 2023-09-21 PROCEDURE — 93010 EKG 12-LEAD: ICD-10-PCS | Mod: ,,, | Performed by: INTERNAL MEDICINE

## 2023-09-21 PROCEDURE — 36415 COLL VENOUS BLD VENIPUNCTURE: CPT

## 2023-09-21 PROCEDURE — 87651 STREP A DNA AMP PROBE: CPT | Performed by: OBSTETRICS & GYNECOLOGY

## 2023-09-21 PROCEDURE — 99284 EMERGENCY DEPT VISIT MOD MDM: CPT

## 2023-09-21 NOTE — ED PROVIDER NOTES
Encounter Date: 2023       History     Chief Complaint   Patient presents with    Sore Throat     Lorena Parrish is a 28 y.o. [id number] 4 weeks s/p  section who presents with sore throat, syncopal episode, & fever. She states two nights before presentation, she experience a syncopal episode. She states she was standing up, began feeling nausea & diaphoretic, and then woke up on the floor. She denies hitting her head during the fall. Since the syncopal episode, she endorses fever & sore throat. She states her temperature at home yesterday was up to 101, taken sublingually. She also endorses a headache, stating she feels pressure behind her eyes and nose. She endorses a non-productive cough and body aches as well. She denies nausea & vomiting.    The history is provided by the patient. No  was used.     Review of patient's allergies indicates:  No Known Allergies  Past Medical History:   Diagnosis Date    ADHD (attention deficit hyperactivity disorder)     Contraception, device intrauterine 2018    Depression      Past Surgical History:   Procedure Laterality Date     SECTION N/A 2023    Procedure:  SECTION;  Surgeon: Yulissa Westfall MD;  Location: [facility] L&D;  Service: OB/GYN;  Laterality: N/A;    KNEE ARTHROSCOPY      REDUCTION OF BOTH BREASTS  10/2021    Dr. Smith    STOMACH SURGERY  2016    gastric sleeve     Family History   Problem Relation Age of Onset    Breast cancer Neg Hx     Colon cancer Neg Hx     Ovarian cancer Neg Hx      Social History     Tobacco Use    Smoking status: Former    Smokeless tobacco: Never   Substance Use Topics    Alcohol use: Yes     Comment: social    Drug use: No     Review of Systems   Constitutional:  Positive for fever. Negative for chills and fatigue.   HENT:  Positive for sinus pressure and sore throat. Negative for congestion.    Eyes:  Negative for visual disturbance.   Respiratory:  Positive for  cough. Negative for shortness of breath.    Cardiovascular:  Negative for chest pain and leg swelling.   Gastrointestinal:  Negative for constipation, diarrhea, nausea and vomiting.   Genitourinary:  Negative for dysuria.   Musculoskeletal:  Positive for myalgias. Negative for arthralgias and joint swelling.   Skin:  Negative for rash.   Neurological:  Positive for syncope and headaches. Negative for weakness.   Psychiatric/Behavioral:  Negative for confusion and sleep disturbance.        Physical Exam     Initial Vitals   BP Pulse Resp Temp SpO2   09/21/23 1413 09/21/23 1411 09/21/23 1413 09/21/23 1413 09/21/23 1412   124/83 84 19 98.3 °F (36.8 °C) 98 %      MAP       --                Physical Exam    Vitals reviewed.  Constitutional: She appears well-developed and well-nourished. She is not diaphoretic. No distress.   HENT:   Head: Normocephalic and atraumatic.   Nose: Nose normal.   Eyes: Conjunctivae and EOM are normal.   Neck:   Normal range of motion.  Cardiovascular:  Normal rate and intact distal pulses.           Pulmonary/Chest: No respiratory distress.   Normal work of breathing   Abdominal: Abdomen is soft.   Gravid Abdomen There is no rebound and no guarding.   Musculoskeletal:         General: Normal range of motion.      Cervical back: Normal range of motion.     Neurological: She is alert and oriented to person, place, and time.   Skin: Skin is warm and dry.   Psychiatric: She has a normal mood and affect. Thought content normal.         ED Course   Procedures  Labs Reviewed   CBC W/ AUTO DIFFERENTIAL - Abnormal; Notable for the following components:       Result Value    Hemoglobin 10.2 (*)     Hematocrit 33.4 (*)     MCV 79 (*)     MCH 24.0 (*)     MCHC 30.5 (*)     RDW 17.2 (*)     All other components within normal limits   INFLUENZA A & B BY MOLECULAR   GROUP A STREP, MOLECULAR   SARS-COV-2 RNA AMPLIFICATION, QUAL          Imaging Results    None          Medications - No data to  display  Medical Decision Making  Lorena Parrish is a 28 y.o. [id number] at 38w4d presents with sore throat, syncopal episode, and fever.    - VSS & WNL, afebrile, not tachycardic, O2 sat 97%  - PE as above  - CBC drawn, H/H 10.2/33.4, up from 1 month ago, otherwise WNL  - Covid 19, influenza, and Strep throat ordered, all negative  - EKG: sinus bradycardia & nonspecific T wave abnormality, negative for acute processes   - Patient stable for DC home  - Patient verbalizes understanding & is in agreement with plan  - Given ED return precautions     Amount and/or Complexity of Data Reviewed  Labs: ordered. Decision-making details documented in ED Course.              Attending Attestation:   Physician Attestation Statement for Resident:  As the supervising MD   Physician Attestation Statement: I have personally seen and examined this patient.   I agree with the above history.  -:   As the supervising MD I agree with the above PE.     As the supervising MD I agree with the above treatment, course, plan, and disposition.   I was personally present during the critical portions of the procedure(s) performed by the resident and was immediately available in the ED to provide services and assistance as needed during the entire procedure.              Attending ED Notes:     Rtuhie Andre MD  2023 5:03 PM          ED Course as of 23 1627   Thu Sep 21, 2023   1605 BP: 119/80 [AW]   1605 Pulse: 70 [AW]   1605 SpO2: 97 % [AW]   1605 Group A Strep, Molecular [AW]   1605 Group A Strep, Molecular: Negative [AW]   1605 CBC auto differential(!) [AW]   1605 WBC: 7.72 [AW]   1605 Hemoglobin(!): 10.2 [AW]   1605 Hematocrit(!): 33.4 [AW]   1605 Platelets: 261 [AW]   1622 Influenza A, Molecular: Negative [AW]   1622 Influenza B, Molecular: Negative [AW]   1622 SARS-CoV-2 RNA, Amplification, Qual: Negative [AW]      ED Course User Index  [AW] Yandy Garnett MD                    Clinical Impression:   Final  diagnoses:  [R55] Syncopal episodes  [Z98.891] Status post  section  [B34.9] Viral syndrome (Primary)        ED Disposition Condition    Discharge Stable          ED Prescriptions    None       Follow-up Information    None              Yandy Garnett MD  Resident  [phone]       Ruthie Washington MD  [phone]

## 2023-09-26 NOTE — PROGRESS NOTES
Postpartum Visit  Lorena Parrish is a 28 y.o. female  is presents via virtual visit for mood check, She is 10 days postpartum following LTCS. Pregnancy complicated by A2GDM.     Reports 100.5 F fever at home currently with left breast pain and engorgement.     She reports feeling very anxious recently. Overwhelmed when attempting to care for baby, especially with episodes of infant crying. Fearful that something could happen to baby, hyper vigilant. Crying at times. Denies SI/HI. Excellent support system at home, mother and partner. Current use of Effexor daily. She has established therapist but not recent visit.    OB History    Para Term  AB Living   1 1 1 0 0 1   SAB IAB Ectopic Multiple Live Births   0 0 0 0 1      # Outcome Date GA Lbr Dany/2nd Weight Sex Delivery Anes PTL Lv   1 Term 23 38w4d  3.24 kg (7 lb 2.3 oz) F CS-LTranv EPI, Spinal N DENISSE      Complications: Fetal Intolerance      Obstetric Comments   Menarche ~ 13       ROS:  GENERAL: No chills, fatigability. Fever reported.  VULVAR: No pain, no lesions and no itching.  VAGINAL: No relaxation, no itching, no discharge, no abnormal bleeding and no lesions.  ABDOMEN: No abdominal pain. Denies nausea. Denies vomiting. No diarrhea. No constipation  BREAST: No discharge. Pain and engorgement.   URINARY: No incontinence, no nocturia, no frequency and no dysuria.  CARDIOVASCULAR: No chest pain. No shortness of breath. No leg cramps.  NEUROLOGICAL: No headaches. No vision changes.      General appearance - alert, well appearing, and in no distress  Mental status - normal mood, behavior, speech, dress, motor activity, and thought processes  Skin - coloration normal for race, good turgor, warm to touch, no rashes      Diagnoses and all orders for this visit:    Acute mastitis of left breast  -     dicloxacillin (DYNAPEN) 500 MG capsule; Take 1 capsule (500 mg total) by mouth every 6 (six) hours. for 10 days    Postpartum  anxiety      Suspected mastitis, Dynapen script to pharmacy with strict ED precautions. Discussed treatment of engorgement.    EPPDS: 16. Primarily anxiety with score review. Reassurance given, emotional support. Declines increase to Effexor. Desires to pursue appt with her therapist. ED precautions for SI/HI.    FU in 2 weeks for mood check.       LIZETH Lea    The patient location is: [place]  The chief complaint leading to consultation is: PPA/Mastitis    Visit type: audiovisual    Face to Face time with patient: 20 minutes  20 minutes of total time spent on the encounter, which includes face to face time and non-face to face time preparing to see the patient (eg, review of tests), Obtaining and/or reviewing separately obtained history, Documenting clinical information in the electronic or other health record, Independently interpreting results (not separately reported) and communicating results to the patient/family/caregiver, or Care coordination (not separately reported).     Each patient to whom he or she provides medical services by telemedicine is:  (1) informed of the relationship between the physician and patient and the respective role of any other health care provider with respect to management of the patient; and (2) notified that he or she may decline to receive medical services by telemedicine and may withdraw from such care at any time.

## 2023-10-01 ENCOUNTER — OFFICE VISIT (OUTPATIENT)
Dept: URGENT CARE | Facility: CLINIC | Age: 28
End: 2023-10-01
Payer: COMMERCIAL

## 2023-10-01 VITALS
SYSTOLIC BLOOD PRESSURE: 103 MMHG | RESPIRATION RATE: 19 BRPM | HEART RATE: 87 BPM | TEMPERATURE: 98 F | BODY MASS INDEX: 30.82 KG/M2 | OXYGEN SATURATION: 96 % | DIASTOLIC BLOOD PRESSURE: 72 MMHG | WEIGHT: 185 LBS | HEIGHT: 65 IN

## 2023-10-01 DIAGNOSIS — S92.302A CLOSED NONDISPLACED FRACTURE OF METATARSAL BONE OF LEFT FOOT, UNSPECIFIED METATARSAL, INITIAL ENCOUNTER: Primary | ICD-10-CM

## 2023-10-01 PROCEDURE — 99213 PR OFFICE/OUTPT VISIT, EST, LEVL III, 20-29 MIN: ICD-10-PCS | Mod: S$GLB,,, | Performed by: NURSE PRACTITIONER

## 2023-10-01 PROCEDURE — 73630 XR FOOT COMPLETE 3 VIEW LEFT: ICD-10-PCS | Mod: FY,LT,S$GLB, | Performed by: RADIOLOGY

## 2023-10-01 PROCEDURE — 99213 OFFICE O/P EST LOW 20 MIN: CPT | Mod: S$GLB,,, | Performed by: NURSE PRACTITIONER

## 2023-10-01 PROCEDURE — 73630 X-RAY EXAM OF FOOT: CPT | Mod: FY,LT,S$GLB, | Performed by: RADIOLOGY

## 2023-10-01 NOTE — PROGRESS NOTES
"Subjective:      Patient ID: Lorena Parrish is a 28 y.o. female.    Vitals:  height is 5' 5" (1.651 m) and weight is 83.9 kg (185 lb). Her temperature is 98.2 °F (36.8 °C). Her blood pressure is 103/72 and her pulse is 87. Her respiration is 19 and oxygen saturation is 96%.     Chief Complaint: Foot Injury (Left Foot )    Pt present with trauma to her left foot. Pt stated she tripped over a baby bouncer at home and has hurt her left foot last night.     Foot Injury   The incident occurred 12 to 24 hours ago. The incident occurred at home. Injury mechanism: Tripped. The pain is present in the left foot. The quality of the pain is described as aching. The pain is at a severity of 9/10. The pain is moderate. The pain has been Constant since onset. Associated symptoms include an inability to bear weight. Pertinent negatives include no numbness or tingling. She reports no foreign bodies present. The symptoms are aggravated by movement and weight bearing. She has tried NSAIDs for the symptoms. The treatment provided mild relief.       Constitution: Negative for fever.   Respiratory:  Negative for shortness of breath.    Musculoskeletal:  Positive for pain.   Skin:  Negative for erythema.        + L foot swollen, no erythema   Neurological:  Negative for numbness.      Objective:     Physical Exam   HENT:   Head: Normocephalic.   Pulmonary/Chest: Effort normal.   Abdominal: Normal appearance.   Musculoskeletal:         General: Swelling and tenderness present.      Comments: + L foot swollen, no erythema. Tenderness with palpitation and pain movement   Neurological: She is alert.   Skin: No erythema       Assessment:     1. Closed nondisplaced fracture of metatarsal bone of left foot, unspecified metatarsal, initial encounter        Plan:     Declined pain mediation  Applied soft cast, given crutches    Closed nondisplaced fracture of metatarsal bone of left foot, unspecified metatarsal, initial encounter  -     " XR FOOT COMPLETE 3 VIEW LEFT; Future; Expected date: 10/01/2023  -     Ambulatory referral/consult to Orthopedics  -     CRUTCHES FOR HOME USE

## 2023-10-02 ENCOUNTER — OFFICE VISIT (OUTPATIENT)
Dept: ORTHOPEDICS | Facility: CLINIC | Age: 28
End: 2023-10-02
Payer: COMMERCIAL

## 2023-10-02 DIAGNOSIS — S92.335A CLOSED NONDISPLACED FRACTURE OF THIRD METATARSAL BONE OF LEFT FOOT, INITIAL ENCOUNTER: ICD-10-CM

## 2023-10-02 DIAGNOSIS — S92.345A CLOSED NONDISPLACED FRACTURE OF FOURTH METATARSAL BONE OF LEFT FOOT, INITIAL ENCOUNTER: ICD-10-CM

## 2023-10-02 DIAGNOSIS — S92.325A CLOSED NONDISPLACED FRACTURE OF SECOND METATARSAL BONE OF LEFT FOOT, INITIAL ENCOUNTER: Primary | ICD-10-CM

## 2023-10-02 PROCEDURE — 99204 OFFICE O/P NEW MOD 45 MIN: CPT | Mod: S$GLB,,, | Performed by: NURSE PRACTITIONER

## 2023-10-02 PROCEDURE — 99999 PR PBB SHADOW E&M-EST. PATIENT-LVL II: CPT | Mod: PBBFAC,,, | Performed by: NURSE PRACTITIONER

## 2023-10-02 PROCEDURE — 99999 PR PBB SHADOW E&M-EST. PATIENT-LVL II: ICD-10-PCS | Mod: PBBFAC,,, | Performed by: NURSE PRACTITIONER

## 2023-10-02 PROCEDURE — 1160F RVW MEDS BY RX/DR IN RCRD: CPT | Mod: CPTII,S$GLB,, | Performed by: NURSE PRACTITIONER

## 2023-10-02 PROCEDURE — 1160F PR REVIEW ALL MEDS BY PRESCRIBER/CLIN PHARMACIST DOCUMENTED: ICD-10-PCS | Mod: CPTII,S$GLB,, | Performed by: NURSE PRACTITIONER

## 2023-10-02 PROCEDURE — 1159F PR MEDICATION LIST DOCUMENTED IN MEDICAL RECORD: ICD-10-PCS | Mod: CPTII,S$GLB,, | Performed by: NURSE PRACTITIONER

## 2023-10-02 PROCEDURE — 99204 PR OFFICE/OUTPT VISIT, NEW, LEVL IV, 45-59 MIN: ICD-10-PCS | Mod: S$GLB,,, | Performed by: NURSE PRACTITIONER

## 2023-10-02 PROCEDURE — 1159F MED LIST DOCD IN RCRD: CPT | Mod: CPTII,S$GLB,, | Performed by: NURSE PRACTITIONER

## 2023-10-02 RX ORDER — IBUPROFEN 600 MG/1
600 TABLET ORAL EVERY 8 HOURS PRN
Qty: 30 TABLET | Refills: 1 | Status: SHIPPED | OUTPATIENT
Start: 2023-10-02

## 2023-10-02 NOTE — PROGRESS NOTES
SUBJECTIVE:     Chief Complaint & History of Present Illness:  Lorena Parrish is a New 28 y.o. year old female patient here for constant left foot/ankle pain which has been present for 2 days, DOI 23.  There is a history of injury.  She reports she tripped over her infant's jumpy and injured her left foot.  She went to urgent care on 2023 where x-ray showed she had fractured her 2nd through 4th metatarsal.  Patient was placed in a posterior splint, given crutches, and referred to Orthopedics.     The pain is located in the heel and midfoot aspect of the foot/ankle.  The pain is described as dull, 5/10.  It is aggravated by walking.  There is not radiation, numbness or tingling into the toes.  Associated symptoms include discomfort. Previous treatments include rest, OTC analgesics Tylenol, prescription NSAIDS Motrin, and brace which have provided good relief.  There is not a history of previous injury or surgery to the foot.   The patient does not use an assistive device.    Review of patient's allergies indicates:  No Known Allergies      Current Outpatient Medications   Medication Sig Dispense Refill    ibuprofen (ADVIL,MOTRIN) 600 MG tablet Take 1 tablet (600 mg total) by mouth every 8 (eight) hours as needed for Pain. 30 tablet 1    pantoprazole (PROTONIX) 40 MG tablet       venlafaxine (EFFEXOR) 75 MG tablet Take 75 mg by mouth.       No current facility-administered medications for this visit.       Past Medical History:   Diagnosis Date    ADHD (attention deficit hyperactivity disorder)     Contraception, device intrauterine 2018    Depression        Past Surgical History:   Procedure Laterality Date     SECTION N/A 2023    Procedure:  SECTION;  Surgeon: Yulissa Westfall MD;  Location: [facility] L&D;  Service: OB/GYN;  Laterality: N/A;    KNEE ARTHROSCOPY      REDUCTION OF BOTH BREASTS  10/2021    Dr. Smith    STOMACH SURGERY  2016    gastric  "sleeve       Family History   Problem Relation Age of Onset    Breast cancer Neg Hx     Colon cancer Neg Hx     Ovarian cancer Neg Hx          Review of Systems:  ROS:  Constitutional: no fever or chills  Eyes: no visual changes  ENT: no nasal congestion or sore throat  Respiratory: no cough or shortness of breath  Cardiovascular: no chest pain or palpitations  Gastrointestinal: no nausea or vomiting, tolerating diet  Genitourinary: no hematuria or dysuria  Integument/Breast: no rash or pruritis  Hematologic/Lymphatic: no easy bruising or lymphadenopathy  Musculoskeletal:  left foot fracture  Neurological: no seizures or tremors  Behavioral/Psych: no auditory or visual hallucinations  Endocrine: no heat or cold intolerance      OBJECTIVE:     PHYSICAL EXAM:  Vital Signs (Most Recent)  There were no vitals filed for this visit.     ,   Estimated body mass index is 30.79 kg/m² as calculated from the following:    Height as of 10/1/23: 5' 5" (1.651 m).    Weight as of 10/1/23: 83.9 kg (185 lb).   General Appearance: Well nourished, well developed, in no acute distress.  HENT: Normal cephalic, oropharynx pink and moist  Eyes: PERRLA bilaterally and EOM x 4  Respiratory: Even and unlabored  Skin: Warm and Dry.   Psychiatric: AAO x 4, Mood & affect are normal.    left  Foot/Ankle    General appearance: no acute distress, alert/oriented x3, appropriate mood and affect, looks stated age, and well nourished  The examination was performed out of splint/cast  Skin: normal  Swelling: minimal  Warmth: no warmth  Tenderness: diffuse  ROM: normal  Strength: normal  Stability: stable to testing and Cotton test: negative  Crepitus: no  Neurological Exam: normal  Vascular Exam: normal and pulse present    soft tissue swelling noted over the dorsum of foot      RADIOGRAPHS:  X-ray of the left foot dated October 1, 2023 shows nondisplaced fracture of the proximal metatarsals at 2, 3, and 4.  No other fractures noted.  All radiographs " were personally reviewed by me.    ASSESSMENT/PLAN:       ICD-10-CM ICD-9-CM   1. Closed nondisplaced fracture of second metatarsal bone of left foot, initial encounter  S92.325A 825.25   2. Closed nondisplaced fracture of third metatarsal bone of left foot, initial encounter  S92.335A 825.25   3. Closed nondisplaced fracture of fourth metatarsal bone of left foot, initial encounter  S92.345A 825.25       Plan:  -Lorena Parrish presents to clinic today with c/c left metatarsal fracture secondary to a tripping injury.  Date of injury September 30th 2023.  -X-ray as above.  -Recommend RICE therapy.    I will transition the patient to a short cam boot and allow her to weight bear as tolerated.  She was advised to let pain be her guide.  She needs to avoid high impact activity for the next 6-8 weeks.    I will treat her with prescription Motrin 600 mg 3 times a day p.r.n. and she may continue using Tylenol OTC p.r.n..  I encouraged ice and elevation.    I will see the patient back in 2 weeks at which time we will repeat x-ray of her left foot.

## 2023-10-09 ENCOUNTER — TELEPHONE (OUTPATIENT)
Dept: OBSTETRICS AND GYNECOLOGY | Facility: CLINIC | Age: 28
End: 2023-10-09
Payer: COMMERCIAL

## 2023-10-09 RX ORDER — FLUCONAZOLE 150 MG/1
150 TABLET ORAL DAILY
Qty: 2 TABLET | Refills: 0 | Status: SHIPPED | OUTPATIENT
Start: 2023-10-09 | End: 2023-10-12

## 2023-10-09 NOTE — TELEPHONE ENCOUNTER
Pt thinks she has a yeast infection.  Feels like she gets it when the weather changes.  Severe internal itching. When she uses a cream will get a bartholin cyst.  Requesting rx for a pill. Recommended an appt if no improvement in a week.

## 2023-10-12 ENCOUNTER — POSTPARTUM VISIT (OUTPATIENT)
Dept: OBSTETRICS AND GYNECOLOGY | Facility: CLINIC | Age: 28
End: 2023-10-12
Payer: COMMERCIAL

## 2023-10-12 VITALS
HEIGHT: 65 IN | SYSTOLIC BLOOD PRESSURE: 120 MMHG | BODY MASS INDEX: 30.49 KG/M2 | DIASTOLIC BLOOD PRESSURE: 82 MMHG | WEIGHT: 183 LBS

## 2023-10-12 DIAGNOSIS — Z30.014 ENCOUNTER FOR INITIAL PRESCRIPTION OF INTRAUTERINE CONTRACEPTIVE DEVICE (IUD): ICD-10-CM

## 2023-10-12 PROCEDURE — 0503F PR POSTPARTUM CARE VISIT: ICD-10-PCS | Mod: CPTII,S$GLB,, | Performed by: OBSTETRICS & GYNECOLOGY

## 2023-10-12 PROCEDURE — 99999 PR PBB SHADOW E&M-EST. PATIENT-LVL III: CPT | Mod: PBBFAC,,, | Performed by: OBSTETRICS & GYNECOLOGY

## 2023-10-12 PROCEDURE — 0503F POSTPARTUM CARE VISIT: CPT | Mod: CPTII,S$GLB,, | Performed by: OBSTETRICS & GYNECOLOGY

## 2023-10-12 PROCEDURE — 99999 PR PBB SHADOW E&M-EST. PATIENT-LVL III: ICD-10-PCS | Mod: PBBFAC,,, | Performed by: OBSTETRICS & GYNECOLOGY

## 2023-10-12 RX ORDER — LIRAGLUTIDE 6 MG/ML
3 INJECTION, SOLUTION SUBCUTANEOUS
COMMUNITY

## 2023-10-12 RX ORDER — VENLAFAXINE HYDROCHLORIDE 150 MG/1
150 CAPSULE, EXTENDED RELEASE ORAL DAILY
Qty: 30 CAPSULE | Refills: 11 | Status: SHIPPED | OUTPATIENT
Start: 2023-10-12 | End: 2023-11-13 | Stop reason: SDUPTHER

## 2023-10-12 NOTE — PROGRESS NOTES
"28 y.o. female for postpartum visit.  Patient has no complaints.  Her delivery records were reviewed.  She is bottle feeding infant. She plans to use condoms for contraception before IUD placement. Feels like mood-wise she's doing much better. Not quite herself yet but getting there. Doing well on Effexor and seeing a therapist.    Exam:  General - well appearing, no apparent distress  Vitals:    10/12/23 1356   BP: 120/82   Weight: 83 kg (182 lb 15.7 oz)   Height: 5' 5" (1.651 m)   PainSc: 0-No pain     Abdomen - soft, non tender, non distended   Incision - well healed.  Pelvic - normal external genitalia, any lacerations well healed               uterus non tender, appropriately sized  Extremeties - no edema    PP depression score: 12    A: encounter for postpartum assessment    P:  return when due for annual  - cleared for normal activity  - needs 2 hour PP glucose tolerance test  - increase Effexor from 75 mg regular release to 150 mg XR  - continue with therapy  - Mirena ordered. Will place under u/s guidance.    "

## 2023-10-16 ENCOUNTER — OFFICE VISIT (OUTPATIENT)
Dept: ORTHOPEDICS | Facility: CLINIC | Age: 28
End: 2023-10-16
Payer: COMMERCIAL

## 2023-10-16 ENCOUNTER — HOSPITAL ENCOUNTER (OUTPATIENT)
Dept: RADIOLOGY | Facility: HOSPITAL | Age: 28
Discharge: HOME | End: 2023-10-16
Attending: NURSE PRACTITIONER
Payer: COMMERCIAL

## 2023-10-16 DIAGNOSIS — S92.325A CLOSED NONDISPLACED FRACTURE OF SECOND METATARSAL BONE OF LEFT FOOT, INITIAL ENCOUNTER: ICD-10-CM

## 2023-10-16 DIAGNOSIS — S92.325A CLOSED NONDISPLACED FRACTURE OF SECOND METATARSAL BONE OF LEFT FOOT, INITIAL ENCOUNTER: Primary | ICD-10-CM

## 2023-10-16 DIAGNOSIS — S92.325D CLOSED NONDISPLACED FRACTURE OF SECOND METATARSAL BONE OF LEFT FOOT WITH ROUTINE HEALING, SUBSEQUENT ENCOUNTER: Primary | ICD-10-CM

## 2023-10-16 DIAGNOSIS — S92.345D CLOSED NONDISPLACED FRACTURE OF FOURTH METATARSAL BONE OF LEFT FOOT WITH ROUTINE HEALING, SUBSEQUENT ENCOUNTER: ICD-10-CM

## 2023-10-16 DIAGNOSIS — S92.335D CLOSED NONDISPLACED FRACTURE OF THIRD METATARSAL BONE OF LEFT FOOT WITH ROUTINE HEALING, SUBSEQUENT ENCOUNTER: ICD-10-CM

## 2023-10-16 PROCEDURE — 99999 PR PBB SHADOW E&M-EST. PATIENT-LVL II: ICD-10-PCS | Mod: PBBFAC,,, | Performed by: NURSE PRACTITIONER

## 2023-10-16 PROCEDURE — 73630 X-RAY EXAM OF FOOT: CPT | Mod: 26,LT,, | Performed by: RADIOLOGY

## 2023-10-16 PROCEDURE — 99213 PR OFFICE/OUTPT VISIT, EST, LEVL III, 20-29 MIN: ICD-10-PCS | Mod: S$GLB,,, | Performed by: NURSE PRACTITIONER

## 2023-10-16 PROCEDURE — 99213 OFFICE O/P EST LOW 20 MIN: CPT | Mod: S$GLB,,, | Performed by: NURSE PRACTITIONER

## 2023-10-16 PROCEDURE — 1160F PR REVIEW ALL MEDS BY PRESCRIBER/CLIN PHARMACIST DOCUMENTED: ICD-10-PCS | Mod: CPTII,S$GLB,, | Performed by: NURSE PRACTITIONER

## 2023-10-16 PROCEDURE — 1159F PR MEDICATION LIST DOCUMENTED IN MEDICAL RECORD: ICD-10-PCS | Mod: CPTII,S$GLB,, | Performed by: NURSE PRACTITIONER

## 2023-10-16 PROCEDURE — 1159F MED LIST DOCD IN RCRD: CPT | Mod: CPTII,S$GLB,, | Performed by: NURSE PRACTITIONER

## 2023-10-16 PROCEDURE — 73630 X-RAY EXAM OF FOOT: CPT | Mod: TC,LT

## 2023-10-16 PROCEDURE — 73630 XR FOOT COMPLETE 3 VIEW LEFT: ICD-10-PCS | Mod: 26,LT,, | Performed by: RADIOLOGY

## 2023-10-16 PROCEDURE — 99999 PR PBB SHADOW E&M-EST. PATIENT-LVL II: CPT | Mod: PBBFAC,,, | Performed by: NURSE PRACTITIONER

## 2023-10-16 PROCEDURE — 1160F RVW MEDS BY RX/DR IN RCRD: CPT | Mod: CPTII,S$GLB,, | Performed by: NURSE PRACTITIONER

## 2023-10-16 NOTE — PROGRESS NOTES
SUBJECTIVE:     Chief Complaint & History of Present Illness:  Lorena Parrish is a Established 28 y.o. year old female patient here for follow-up for her left metatarsal fractures.  She was last seen in clinic on 2023.  At that time I will place the patient in a short cam boot and allowed her to weight bear as tolerated.  She was asked to follow up in 2 weeks with repeat x-rays.    Currently the patient reports her pain is improving.  She is still using Motrin p.r.n. for pain control.  She has been compliant with avoiding high impact activities.  She admits she is walked out of the boot for short distance at home only.  Denies any falls or injuries since her last visit.  Denies any foot or toe numbness.    Per my last note, on 2023 she tripped over her infant's jumping and injured her left foot.  She went to urgent care the following day to have x-rays done which indicated fractures of the 2nd through 4th metatarsal of her left foot.    Review of patient's allergies indicates:  No Known Allergies      Current Outpatient Medications   Medication Sig Dispense Refill    ibuprofen (ADVIL,MOTRIN) 600 MG tablet Take 1 tablet (600 mg total) by mouth every 8 (eight) hours as needed for Pain. 30 tablet 1    liraglutide, weight loss, (SAXENDA) 3 mg/0.5 mL (18 mg/3 mL) PnIj Inject 3 mg into the skin.      pantoprazole (PROTONIX) 40 MG tablet       venlafaxine (EFFEXOR-XR) 150 MG Cp24 Take 1 capsule (150 mg total) by mouth once daily. 30 capsule 11     No current facility-administered medications for this visit.       Past Medical History:   Diagnosis Date    ADHD (attention deficit hyperactivity disorder)     Contraception, device intrauterine 2018    Depression        Past Surgical History:   Procedure Laterality Date     SECTION N/A 2023    Procedure:  SECTION;  Surgeon: Yulissa Westfall MD;  [facility]: [facility]&D;  Service: OB/GYN;  Laterality: N/A;    KNEE  "ARTHROSCOPY      REDUCTION OF BOTH BREASTS  10/2021    Dr. Smith    STOMACH SURGERY  07/01/2016    gastric sleeve       Family History   Problem Relation Age of Onset    Breast cancer Neg Hx     Colon cancer Neg Hx     Ovarian cancer Neg Hx      Review of Systems:  ROS:  Constitutional: no fever or chills  Eyes: no visual changes  ENT: no nasal congestion or sore throat  Respiratory: no cough or shortness of breath  Cardiovascular: no chest pain or palpitations  Gastrointestinal: no nausea or vomiting, tolerating diet  Genitourinary: no hematuria or dysuria  Integument/Breast: no rash or pruritis  Hematologic/Lymphatic: no easy bruising or lymphadenopathy  Musculoskeletal:  left foot fracture  Neurological: no seizures or tremors  Behavioral/Psych: no auditory or visual hallucinations  Endocrine: no heat or cold intolerance      OBJECTIVE:     PHYSICAL EXAM:  Vital Signs (Most Recent)  There were no vitals filed for this visit.     ,   Estimated body mass index is 30.45 kg/m² as calculated from the following:    Height as of 10/12/23: 5' 5" (1.651 m).    Weight as of 10/12/23: 83 kg (182 lb 15.7 oz).   General Appearance: Well nourished, well developed, in no acute distress.  HENT: Normal cephalic, oropharynx pink and moist  Eyes: PERRLA bilaterally and EOM x 4  Respiratory: Even and unlabored  Skin: Warm and Dry.   Psychiatric: AAO x 4, Mood & affect are normal.    left  Foot/Ankle    General appearance: no acute distress, alert/oriented x3, appropriate mood and affect, looks stated age, and well nourished  The examination was performed out of splint/cast  Skin: normal  Swelling: minimal  Warmth: no warmth  Tenderness: diffuse  ROM: normal  Strength: normal  Gait: Normal  Stability: stable to testing and Cotton test: negative  Crepitus: no  Neurological Exam: normal  Vascular Exam: normal and pulse present    soft tissue swelling noted over the dorsum of foot      RADIOGRAPHS:  X-ray of the left foot shows " nondisplaced fracture of the proximal metatarsals at 2, 3, and 4.  Fractures appear similar to prior xray from 2 weeks ago.  No new fractures seen.  All radiographs were personally reviewed by me.    ASSESSMENT/PLAN:       ICD-10-CM ICD-9-CM   1. Closed nondisplaced fracture of second metatarsal bone of left foot with routine healing, subsequent encounter  S92.325D V54.19   2. Closed nondisplaced fracture of third metatarsal bone of left foot with routine healing, subsequent encounter  S92.335D V54.19   3. Closed nondisplaced fracture of fourth metatarsal bone of left foot with routine healing, subsequent encounter  S92.345D V54.19     Plan:  -Lorena Parrish presents to clinic today with c/c left metatarsal fracture secondary to a tripping injury.  Date of injury September 30th 2023.  -X-ray as above.  -Recommend RICE therapy.    I will keep her in a short cam boot and allow her to continue to weight bear as tolerated.  She was advised to let pain be her guide.  She needs to avoid high impact activity for 6-8 weeks from date of injury.    I recommend she continue Motrin 600 mg 3 times a day p.r.n. and she may continue using Tylenol OTC p.r.n..  I encouraged ice and elevation.    I will see the patient back in 2 weeks at which time we will repeat x-ray of her left foot.  If fracture remains stable, will consider transitioning her into a regular hard shoe.

## 2023-10-17 ENCOUNTER — LAB VISIT (OUTPATIENT)
Dept: LAB | Facility: HOSPITAL | Age: 28
End: 2023-10-17
Attending: OBSTETRICS & GYNECOLOGY
Payer: COMMERCIAL

## 2023-10-17 LAB
GLUCOSE SERPL-MCNC: 199 MG/DL
GLUCOSE SERPL-MCNC: 54 MG/DL
GLUCOSE SERPL-MCNC: 91 MG/DL (ref 70–110)

## 2023-10-17 PROCEDURE — 82951 GLUCOSE TOLERANCE TEST (GTT): CPT | Performed by: OBSTETRICS & GYNECOLOGY

## 2023-10-17 PROCEDURE — 36415 COLL VENOUS BLD VENIPUNCTURE: CPT | Performed by: OBSTETRICS & GYNECOLOGY

## 2023-10-31 ENCOUNTER — HOSPITAL ENCOUNTER (OUTPATIENT)
Dept: RADIOLOGY | Facility: HOSPITAL | Age: 28
Discharge: HOME | End: 2023-10-31
Attending: NURSE PRACTITIONER
Payer: COMMERCIAL

## 2023-10-31 ENCOUNTER — OFFICE VISIT (OUTPATIENT)
Dept: ORTHOPEDICS | Facility: CLINIC | Age: 28
End: 2023-10-31
Payer: COMMERCIAL

## 2023-10-31 DIAGNOSIS — M79.672 LEFT FOOT PAIN: ICD-10-CM

## 2023-10-31 DIAGNOSIS — M79.672 LEFT FOOT PAIN: Primary | ICD-10-CM

## 2023-10-31 DIAGNOSIS — S92.345D CLOSED NONDISPLACED FRACTURE OF FOURTH METATARSAL BONE OF LEFT FOOT WITH ROUTINE HEALING, SUBSEQUENT ENCOUNTER: Primary | ICD-10-CM

## 2023-10-31 DIAGNOSIS — S92.325A CLOSED NONDISPLACED FRACTURE OF SECOND METATARSAL BONE OF LEFT FOOT, INITIAL ENCOUNTER: ICD-10-CM

## 2023-10-31 DIAGNOSIS — S92.325D CLOSED NONDISPLACED FRACTURE OF SECOND METATARSAL BONE OF LEFT FOOT WITH ROUTINE HEALING, SUBSEQUENT ENCOUNTER: ICD-10-CM

## 2023-10-31 DIAGNOSIS — S92.335D CLOSED NONDISPLACED FRACTURE OF THIRD METATARSAL BONE OF LEFT FOOT WITH ROUTINE HEALING, SUBSEQUENT ENCOUNTER: ICD-10-CM

## 2023-10-31 PROCEDURE — 1160F PR REVIEW ALL MEDS BY PRESCRIBER/CLIN PHARMACIST DOCUMENTED: ICD-10-PCS | Mod: CPTII,S$GLB,, | Performed by: NURSE PRACTITIONER

## 2023-10-31 PROCEDURE — 73630 X-RAY EXAM OF FOOT: CPT | Mod: TC,LT

## 2023-10-31 PROCEDURE — 99213 OFFICE O/P EST LOW 20 MIN: CPT | Mod: S$GLB,,, | Performed by: NURSE PRACTITIONER

## 2023-10-31 PROCEDURE — 73630 XR FOOT COMPLETE 3 VIEW LEFT: ICD-10-PCS | Mod: 26,LT,, | Performed by: RADIOLOGY

## 2023-10-31 PROCEDURE — 73630 X-RAY EXAM OF FOOT: CPT | Mod: 26,LT,, | Performed by: RADIOLOGY

## 2023-10-31 PROCEDURE — 1159F MED LIST DOCD IN RCRD: CPT | Mod: CPTII,S$GLB,, | Performed by: NURSE PRACTITIONER

## 2023-10-31 PROCEDURE — 1160F RVW MEDS BY RX/DR IN RCRD: CPT | Mod: CPTII,S$GLB,, | Performed by: NURSE PRACTITIONER

## 2023-10-31 PROCEDURE — 99999 PR PBB SHADOW E&M-EST. PATIENT-LVL II: CPT | Mod: PBBFAC,,, | Performed by: NURSE PRACTITIONER

## 2023-10-31 PROCEDURE — 99999 PR PBB SHADOW E&M-EST. PATIENT-LVL II: ICD-10-PCS | Mod: PBBFAC,,, | Performed by: NURSE PRACTITIONER

## 2023-10-31 PROCEDURE — 1159F PR MEDICATION LIST DOCUMENTED IN MEDICAL RECORD: ICD-10-PCS | Mod: CPTII,S$GLB,, | Performed by: NURSE PRACTITIONER

## 2023-10-31 PROCEDURE — 99213 PR OFFICE/OUTPT VISIT, EST, LEVL III, 20-29 MIN: ICD-10-PCS | Mod: S$GLB,,, | Performed by: NURSE PRACTITIONER

## 2023-10-31 NOTE — PROGRESS NOTES
SUBJECTIVE:     Chief Complaint & History of Present Illness:  Lorena Parrish is a Established 28 y.o. year old female patient here for follow-up for her left metatarsal fractures.  She was last seen in clinic on 2023.  At that time I told her to continue wearing her short cam boot and weight bear as tolerated and avoid high impact activities.    Currently, the patient reports her pain continues to improve.  She no longer requires medication to treat her symptoms.  She has been able to walk out of the boot within her home for short distance and feels okay.  Denies any falls or injuries since last seen.  Denies any foot or toe numbness.    Per my last note, on 2023 she tripped over her infant's jumping and injured her left foot.  She went to urgent care the following day to have x-rays done which indicated fractures of the 2nd through 4th metatarsal of her left foot.    Review of patient's allergies indicates:  No Known Allergies      Current Outpatient Medications   Medication Sig Dispense Refill    ibuprofen (ADVIL,MOTRIN) 600 MG tablet Take 1 tablet (600 mg total) by mouth every 8 (eight) hours as needed for Pain. 30 tablet 1    liraglutide, weight loss, (SAXENDA) 3 mg/0.5 mL (18 mg/3 mL) PnIj Inject 3 mg into the skin.      pantoprazole (PROTONIX) 40 MG tablet       venlafaxine (EFFEXOR-XR) 150 MG Cp24 Take 1 capsule (150 mg total) by mouth once daily. 30 capsule 11     No current facility-administered medications for this visit.       Past Medical History:   Diagnosis Date    ADHD (attention deficit hyperactivity disorder)     Contraception, device intrauterine 2018    Depression        Past Surgical History:   Procedure Laterality Date     SECTION N/A 2023    Procedure:  SECTION;  Surgeon: Yulissa Westfall MD;  Location: [facility] L&D;  Service: OB/GYN;  Laterality: N/A;    KNEE ARTHROSCOPY      REDUCTION OF BOTH BREASTS  10/2021    Dr. Smith     "STOMACH SURGERY  07/01/2016    gastric sleeve       Family History   Problem Relation Age of Onset    Breast cancer Neg Hx     Colon cancer Neg Hx     Ovarian cancer Neg Hx      Review of Systems:  ROS:  Constitutional: no fever or chills  Eyes: no visual changes  ENT: no nasal congestion or sore throat  Respiratory: no cough or shortness of breath  Cardiovascular: no chest pain or palpitations  Gastrointestinal: no nausea or vomiting, tolerating diet  Genitourinary: no hematuria or dysuria  Integument/Breast: no rash or pruritis  Hematologic/Lymphatic: no easy bruising or lymphadenopathy  Musculoskeletal:  left foot fracture  Neurological: no seizures or tremors  Behavioral/Psych: no auditory or visual hallucinations  Endocrine: no heat or cold intolerance      OBJECTIVE:     PHYSICAL EXAM:  Vital Signs (Most Recent)  There were no vitals filed for this visit.     ,   Estimated body mass index is 30.45 kg/m² as calculated from the following:    Height as of 10/12/23: 5' 5" (1.651 m).    Weight as of 10/12/23: 83 kg (182 lb 15.7 oz).   General Appearance: Well nourished, well developed, in no acute distress.  HENT: Normal cephalic, oropharynx pink and moist  Eyes: PERRLA bilaterally and EOM x 4  Respiratory: Even and unlabored  Skin: Warm and Dry.   Psychiatric: AAO x 4, Mood & affect are normal.    left  Foot/Ankle    General appearance: no acute distress, alert/oriented x3, appropriate mood and affect, looks stated age, and well nourished  The examination was performed out of splint/cast  Skin: normal  Swelling:  None  Warmth: no warmth  Tenderness:  None  ROM: normal  Strength: normal  Gait: Normal  Stability: stable to testing and Cotton test: negative  Crepitus: no  Neurological Exam: normal  Vascular Exam: normal and pulse present    normal exam, no swelling, tenderness, instability; ligaments intact, full range of motion of all ankle/foot joints      RADIOGRAPHS:  X-ray of the left foot shows nondisplaced " fracture of the proximal metatarsals at 2, 3, and 4.  Fractures appear to be healing with some callus formation when compared to last x-ray.  No new fractures seen.  All radiographs were personally reviewed by me.    ASSESSMENT/PLAN:       ICD-10-CM ICD-9-CM   1. Closed nondisplaced fracture of fourth metatarsal bone of left foot with routine healing, subsequent encounter  S92.345D V54.19   2. Closed nondisplaced fracture of second metatarsal bone of left foot with routine healing, subsequent encounter  S92.325D V54.19   3. Closed nondisplaced fracture of third metatarsal bone of left foot with routine healing, subsequent encounter  S92.335D V54.19       Plan:  -Lorena Parrish presents to clinic today with c/c left metatarsal fracture secondary to a tripping injury.  Date of injury September 30th 2023.  -X-ray as above.    I will allow her to come out of the boot into a regular shoe and continue to weight bear as tolerated.  I recommend she continue to avoid high impact activity for a total of 6-8 weeks from the date of injury.    I will see the patient back in 6 weeks at which time we will repeat x-ray of her left foot, if fracture remains stable we will consider discharging the patient.

## 2023-11-09 ENCOUNTER — PATIENT MESSAGE (OUTPATIENT)
Dept: OBSTETRICS AND GYNECOLOGY | Facility: CLINIC | Age: 28
End: 2023-11-09

## 2023-11-09 NOTE — TELEPHONE ENCOUNTER
Effexor usually help with these symptoms; doesn't normally cause them. Has anything else changed? New meds? Diet?

## 2023-11-13 ENCOUNTER — OFFICE VISIT (OUTPATIENT)
Dept: OBSTETRICS AND GYNECOLOGY | Facility: CLINIC | Age: 28
End: 2023-11-13
Payer: COMMERCIAL

## 2023-11-13 VITALS
BODY MASS INDEX: 29.79 KG/M2 | DIASTOLIC BLOOD PRESSURE: 80 MMHG | WEIGHT: 178.81 LBS | SYSTOLIC BLOOD PRESSURE: 120 MMHG | HEIGHT: 65 IN

## 2023-11-13 DIAGNOSIS — Z12.4 SCREENING FOR CERVICAL CANCER: ICD-10-CM

## 2023-11-13 DIAGNOSIS — Z01.419 ENCOUNTER FOR GYNECOLOGICAL EXAMINATION: Primary | ICD-10-CM

## 2023-11-13 PROCEDURE — 3074F PR MOST RECENT SYSTOLIC BLOOD PRESSURE < 130 MM HG: ICD-10-PCS | Mod: CPTII,S$GLB,, | Performed by: OBSTETRICS & GYNECOLOGY

## 2023-11-13 PROCEDURE — 99395 PR PREVENTIVE VISIT,EST,18-39: ICD-10-PCS | Mod: S$GLB,,, | Performed by: OBSTETRICS & GYNECOLOGY

## 2023-11-13 PROCEDURE — 3074F SYST BP LT 130 MM HG: CPT | Mod: CPTII,S$GLB,, | Performed by: OBSTETRICS & GYNECOLOGY

## 2023-11-13 PROCEDURE — 3079F PR MOST RECENT DIASTOLIC BLOOD PRESSURE 80-89 MM HG: ICD-10-PCS | Mod: CPTII,S$GLB,, | Performed by: OBSTETRICS & GYNECOLOGY

## 2023-11-13 PROCEDURE — 88175 CYTOPATH C/V AUTO FLUID REDO: CPT | Performed by: OBSTETRICS & GYNECOLOGY

## 2023-11-13 PROCEDURE — 99395 PREV VISIT EST AGE 18-39: CPT | Mod: S$GLB,,, | Performed by: OBSTETRICS & GYNECOLOGY

## 2023-11-13 PROCEDURE — 3008F BODY MASS INDEX DOCD: CPT | Mod: CPTII,S$GLB,, | Performed by: OBSTETRICS & GYNECOLOGY

## 2023-11-13 PROCEDURE — 1159F PR MEDICATION LIST DOCUMENTED IN MEDICAL RECORD: ICD-10-PCS | Mod: CPTII,S$GLB,, | Performed by: OBSTETRICS & GYNECOLOGY

## 2023-11-13 PROCEDURE — 3079F DIAST BP 80-89 MM HG: CPT | Mod: CPTII,S$GLB,, | Performed by: OBSTETRICS & GYNECOLOGY

## 2023-11-13 PROCEDURE — 99999 PR PBB SHADOW E&M-EST. PATIENT-LVL II: ICD-10-PCS | Mod: PBBFAC,,, | Performed by: OBSTETRICS & GYNECOLOGY

## 2023-11-13 PROCEDURE — 3008F PR BODY MASS INDEX (BMI) DOCUMENTED: ICD-10-PCS | Mod: CPTII,S$GLB,, | Performed by: OBSTETRICS & GYNECOLOGY

## 2023-11-13 PROCEDURE — 99999 PR PBB SHADOW E&M-EST. PATIENT-LVL II: CPT | Mod: PBBFAC,,, | Performed by: OBSTETRICS & GYNECOLOGY

## 2023-11-13 PROCEDURE — 1159F MED LIST DOCD IN RCRD: CPT | Mod: CPTII,S$GLB,, | Performed by: OBSTETRICS & GYNECOLOGY

## 2023-11-13 RX ORDER — VENLAFAXINE HYDROCHLORIDE 150 MG/1
150 CAPSULE, EXTENDED RELEASE ORAL DAILY
Qty: 30 CAPSULE | Refills: 11 | Status: SHIPPED | OUTPATIENT
Start: 2023-11-13 | End: 2024-11-12

## 2023-11-13 NOTE — PROGRESS NOTES
"Subjective:       Patient ID: Lorena Parrish is a 28 y.o. female.    Chief Complaint:  Well Woman      History of Present Illness  - here for annual. No complaints. Doing better every week.    Past Medical History:   Diagnosis Date    ADHD (attention deficit hyperactivity disorder)     Contraception, device intrauterine 2018    Depression        Past Surgical History:   Procedure Laterality Date     SECTION N/A 2023    Procedure:  SECTION;  Surgeon: Yulissa Westfall MD;  Location: [facility]&D;  Service: OB/GYN;  Laterality: N/A;    KNEE ARTHROSCOPY      REDUCTION OF BOTH BREASTS  10/2021    Dr. Smith    STOMACH SURGERY  2016    gastric sleeve        Family History   Problem Relation Age of Onset    Breast cancer Neg Hx     Colon cancer Neg Hx     Ovarian cancer Neg Hx         Social History     Socioeconomic History    Marital status:    Tobacco Use    Smoking status: Never    Smokeless tobacco: Never   Substance and Sexual Activity    Alcohol use: Yes     Comment: social    Drug use: No    Sexual activity: Yes     Birth control/protection: I.U.D.     Comment: Single:   Mirena  19           Objective:     Vitals:    23 1052   BP: 120/80   Weight: 81.1 kg (178 lb 12.7 oz)   Height: 5' 5" (1.651 m)       Physical Exam:   Constitutional: She is oriented to person, place, and time. She appears well-developed and well-nourished.        Pulmonary/Chest: Right breast exhibits no mass, no nipple discharge, no skin change, no tenderness and no swelling. Left breast exhibits no mass, no nipple discharge, no skin change, no tenderness and no swelling. Breasts are symmetrical.        Abdominal: Soft. She exhibits no distension. There is no abdominal tenderness.     Genitourinary:    Vagina and uterus normal.   There is no tenderness or lesion on the right labia. There is no tenderness or lesion on the left labia. Cervix is normal. Right adnexum displays no mass, no " tenderness and no fullness. Left adnexum displays no mass, no tenderness and no fullness. No  no vaginal discharge in the vagina.    pap smear completed          Musculoskeletal: Moves all extremeties.       Neurological: She is alert and oriented to person, place, and time.     Psychiatric: She has a normal mood and affect.        Assessment/ Plan:     Orders Placed This Encounter    Liquid-Based Pap Smear, Screening    venlafaxine (EFFEXOR-XR) 150 MG Cp24       Lorena was seen today for well woman.    Diagnoses and all orders for this visit:    Encounter for gynecological examination    Screening for cervical cancer  -     Liquid-Based Pap Smear, Screening    Other orders  -     venlafaxine (EFFEXOR-XR) 150 MG Cp24; Take 1 capsule (150 mg total) by mouth once daily.        Follow up in about 1 month (around 12/13/2023) for Mirena placement under u/s guidance.    As of April 1, 2021, the Cures Act has been passed nationally. This new law requires that all doctors progress notes, lab results, pathology reports and radiology reports be released IMMEDIATELY to the patient in the patient portal. That means that the results are released to you at the EXACT same time they are released to me. Therefore, with all of the patients that I have I am not able to reply to each patient exactly when the results come in. So there will be a delay from when you see the results to when I see them and have time to come up with a response to send you. Also I only see these results when I am on the computer at work. So if the results come in over the weekend or after 5 pm of a work day, I will not see them until the next business day. As you can tell, this is a challenge as a physician to give every patient the quick response they hope for and deserve. So please be patient!   Thanks for your understanding and patience.

## 2023-11-15 NOTE — ANESTHESIA POSTPROCEDURE EVALUATION
Anesthesia Post Evaluation    Patient: Lorena Parrish    Procedure(s) Performed: Procedure(s) (LRB):   SECTION (N/A)    Final Anesthesia Type: epidural      Patient location during evaluation: labor & delivery  Patient participation: Yes- Able to Participate  Level of consciousness: awake and alert  Post-procedure vital signs: reviewed and stable  Pain management: adequate  Airway patency: patent  VONDA mitigation strategies: Multimodal analgesia  PONV status at discharge: No PONV  Anesthetic complications: no      Cardiovascular status: stable and blood pressure returned to baseline  Respiratory status: unassisted, spontaneous ventilation and room air  Hydration status: euvolemic  Follow-up not needed.          Vitals Value Taken Time   /54 23   Temp 36.9 °C (98.4 °F) 23   Pulse 76 23   Resp 17 23 0843   SpO2 98 % 23         Event Time   Out of Recovery 23:08:44         Pain/Oneil Score: Pain Rating Prior to Med Admin: 4 (2023 11:37 AM)  Pain Rating Post Med Admin: 3 (2023  9:40 AM)        
No

## 2023-11-22 LAB
FINAL PATHOLOGIC DIAGNOSIS: NORMAL
Lab: NORMAL

## 2023-11-30 ENCOUNTER — PATIENT MESSAGE (OUTPATIENT)
Dept: OBSTETRICS AND GYNECOLOGY | Facility: CLINIC | Age: 28
End: 2023-11-30

## 2023-12-04 ENCOUNTER — PATIENT MESSAGE (OUTPATIENT)
Dept: OBSTETRICS AND GYNECOLOGY | Facility: CLINIC | Age: 28
End: 2023-12-04

## 2023-12-04 ENCOUNTER — OFFICE VISIT (OUTPATIENT)
Dept: OBSTETRICS AND GYNECOLOGY | Facility: CLINIC | Age: 28
End: 2023-12-04
Payer: COMMERCIAL

## 2023-12-04 VITALS
BODY MASS INDEX: 30.49 KG/M2 | WEIGHT: 183 LBS | HEIGHT: 65 IN | SYSTOLIC BLOOD PRESSURE: 120 MMHG | DIASTOLIC BLOOD PRESSURE: 70 MMHG

## 2023-12-04 DIAGNOSIS — Z01.818 PRE-PROCEDURAL EXAMINATION: ICD-10-CM

## 2023-12-04 DIAGNOSIS — Z30.430 ENCOUNTER FOR INSERTION OF MIRENA IUD: Primary | ICD-10-CM

## 2023-12-04 PROBLEM — O24.415 GESTATIONAL DIABETES MELLITUS (GDM) CONTROLLED ON ORAL HYPOGLYCEMIC DRUG: Status: ACTIVE | Noted: 2023-12-04

## 2023-12-04 PROCEDURE — 58300 INSERT INTRAUTERINE DEVICE: CPT | Mod: S$GLB,,, | Performed by: OBSTETRICS & GYNECOLOGY

## 2023-12-04 PROCEDURE — 99499 NO LOS: ICD-10-PCS | Mod: S$GLB,,, | Performed by: OBSTETRICS & GYNECOLOGY

## 2023-12-04 PROCEDURE — 99499 UNLISTED E&M SERVICE: CPT | Mod: S$GLB,,, | Performed by: OBSTETRICS & GYNECOLOGY

## 2023-12-04 PROCEDURE — 58300 INSERTION OF IUD: ICD-10-PCS | Mod: S$GLB,,, | Performed by: OBSTETRICS & GYNECOLOGY

## 2023-12-04 PROCEDURE — 99999 PR PBB SHADOW E&M-EST. PATIENT-LVL III: CPT | Mod: PBBFAC,,, | Performed by: OBSTETRICS & GYNECOLOGY

## 2023-12-04 PROCEDURE — 99999 PR PBB SHADOW E&M-EST. PATIENT-LVL III: ICD-10-PCS | Mod: PBBFAC,,, | Performed by: OBSTETRICS & GYNECOLOGY

## 2023-12-04 NOTE — PROCEDURES
Insertion of IUD    Date/Time: 12/4/2023 3:00 PM    Performed by: Yulissa Westfall MD  Authorized by: Yulissa Westfall MD    Consent:     Consent obtained:  Prior to procedure the appropriate consent was completed and verified    Consent given by:  Patient    Procedure risks and benefits discussed: yes      Patient questions answered: yes      Patient agrees, verbalizes understanding, and wants to proceed: yes     Device to be inserted was verified by patient: yes    Educational handouts given: no      Instructions and paperwork completed: yes    Insertion Procedure:   1 Intra Uterine Device levonorgestreL 21 mcg/24 hours (8 yrs) 52 mg       Pelvic exam performed: yes      Negative GC/chlamydia test: yes      Negative urine pregnancy test: yes      Negative serum pregnancy test: no      Cervix cleaned and prepped: yes      Speculum placed in vagina: yes      Tenaculum applied to cervix: no      Uterus sounded: yes      Uterus sound depth (cm):  9    IUD inserted with no complications: yes      IUD type:  Mirena    Strings trimmed: yes    Post-procedure:     Patient tolerated procedure well: yes      Patient will follow up after next period: yes    Comments:      Risks/benefits discussed and consents signed. Time out performed.    Speculum inserted. Cervix cleaned with Betadine. Under u/s guidance, uterus sounded to 9 cm. Under u/s guidance, Mirena fitted to sound depth. Mirena inserted without difficulty. Tolerated well. Strings trimmed to 3 -4 cm from external os. Excellent hemostasis noted.    U/S confirms proper placement of Mirena.    Patient will follow up in 4 weeks to assess Mirena strings.

## 2023-12-12 ENCOUNTER — OFFICE VISIT (OUTPATIENT)
Dept: ORTHOPEDICS | Facility: CLINIC | Age: 28
End: 2023-12-12
Payer: COMMERCIAL

## 2023-12-12 ENCOUNTER — HOSPITAL ENCOUNTER (OUTPATIENT)
Dept: RADIOLOGY | Facility: HOSPITAL | Age: 28
Discharge: HOME | End: 2023-12-12
Attending: NURSE PRACTITIONER
Payer: COMMERCIAL

## 2023-12-12 VITALS — BODY MASS INDEX: 30.49 KG/M2 | WEIGHT: 183 LBS | HEIGHT: 65 IN

## 2023-12-12 DIAGNOSIS — S92.345D CLOSED NONDISPLACED FRACTURE OF FOURTH METATARSAL BONE OF LEFT FOOT WITH ROUTINE HEALING, SUBSEQUENT ENCOUNTER: Primary | ICD-10-CM

## 2023-12-12 DIAGNOSIS — M79.672 LEFT FOOT PAIN: Primary | ICD-10-CM

## 2023-12-12 DIAGNOSIS — M79.672 LEFT FOOT PAIN: ICD-10-CM

## 2023-12-12 DIAGNOSIS — S92.335D CLOSED NONDISPLACED FRACTURE OF THIRD METATARSAL BONE OF LEFT FOOT WITH ROUTINE HEALING, SUBSEQUENT ENCOUNTER: ICD-10-CM

## 2023-12-12 DIAGNOSIS — S92.325D CLOSED NONDISPLACED FRACTURE OF SECOND METATARSAL BONE OF LEFT FOOT WITH ROUTINE HEALING, SUBSEQUENT ENCOUNTER: ICD-10-CM

## 2023-12-12 PROCEDURE — 73630 X-RAY EXAM OF FOOT: CPT | Mod: TC,LT

## 2023-12-12 PROCEDURE — 99999 PR PBB SHADOW E&M-EST. PATIENT-LVL III: CPT | Mod: PBBFAC,,, | Performed by: NURSE PRACTITIONER

## 2023-12-12 PROCEDURE — 1159F PR MEDICATION LIST DOCUMENTED IN MEDICAL RECORD: ICD-10-PCS | Mod: CPTII,S$GLB,, | Performed by: NURSE PRACTITIONER

## 2023-12-12 PROCEDURE — 1159F MED LIST DOCD IN RCRD: CPT | Mod: CPTII,S$GLB,, | Performed by: NURSE PRACTITIONER

## 2023-12-12 PROCEDURE — 99213 PR OFFICE/OUTPT VISIT, EST, LEVL III, 20-29 MIN: ICD-10-PCS | Mod: S$GLB,,, | Performed by: NURSE PRACTITIONER

## 2023-12-12 PROCEDURE — 1160F PR REVIEW ALL MEDS BY PRESCRIBER/CLIN PHARMACIST DOCUMENTED: ICD-10-PCS | Mod: CPTII,S$GLB,, | Performed by: NURSE PRACTITIONER

## 2023-12-12 PROCEDURE — 3008F BODY MASS INDEX DOCD: CPT | Mod: CPTII,S$GLB,, | Performed by: NURSE PRACTITIONER

## 2023-12-12 PROCEDURE — 99999 PR PBB SHADOW E&M-EST. PATIENT-LVL III: ICD-10-PCS | Mod: PBBFAC,,, | Performed by: NURSE PRACTITIONER

## 2023-12-12 PROCEDURE — 99213 OFFICE O/P EST LOW 20 MIN: CPT | Mod: S$GLB,,, | Performed by: NURSE PRACTITIONER

## 2023-12-12 PROCEDURE — 73630 X-RAY EXAM OF FOOT: CPT | Mod: 26,LT,, | Performed by: RADIOLOGY

## 2023-12-12 PROCEDURE — 3008F PR BODY MASS INDEX (BMI) DOCUMENTED: ICD-10-PCS | Mod: CPTII,S$GLB,, | Performed by: NURSE PRACTITIONER

## 2023-12-12 PROCEDURE — 1160F RVW MEDS BY RX/DR IN RCRD: CPT | Mod: CPTII,S$GLB,, | Performed by: NURSE PRACTITIONER

## 2023-12-12 PROCEDURE — 73630 XR FOOT COMPLETE 3 VIEW LEFT: ICD-10-PCS | Mod: 26,LT,, | Performed by: RADIOLOGY

## 2023-12-12 NOTE — PROGRESS NOTES
SUBJECTIVE:     Chief Complaint & History of Present Illness:  Lorena Parrish is a Established 28 y.o. year old female patient here for follow-up for her left metatarsal fractures.  She was last seen in clinic on October 31, 2023.  At that time I allowed her to transition to a regular shoe.  Told her to continue to avoid high impact activities.      Currently, the patient reports her pain has resolved.  She no longer requires medication to treat her symptoms.  She is back in regular shoes.  She reports when she wears a boot or a strap over the area it causes her discomfort otherwise she has no pain.  Denies any falls or injuries since last seen.  Denies any foot or toe numbness.    Per my last note, on September 30, 2023 she tripped over her infant's jumping and injured her left foot.  She went to urgent care the following day to have x-rays done which indicated fractures of the 2nd through 4th metatarsal of her left foot.    Review of patient's allergies indicates:  No Known Allergies      Current Outpatient Medications   Medication Sig Dispense Refill    ibuprofen (ADVIL,MOTRIN) 600 MG tablet Take 1 tablet (600 mg total) by mouth every 8 (eight) hours as needed for Pain. 30 tablet 1    liraglutide, weight loss, (SAXENDA) 3 mg/0.5 mL (18 mg/3 mL) PnIj Inject 3 mg into the skin.      pantoprazole (PROTONIX) 40 MG tablet       venlafaxine (EFFEXOR-XR) 150 MG Cp24 Take 1 capsule (150 mg total) by mouth once daily. 30 capsule 11     Current Facility-Administered Medications   Medication Dose Route Frequency Provider Last Rate Last Admin    levonorgestreL (MIRENA) 21 mcg/24 hours (8 yrs) 52 mg IUD 1 Intra Uterine Device  1 Intra Uterine Device Intrauterine  Yulissa Westfall MD   1 Intra Uterine Device at 12/04/23 1500       Past Medical History:   Diagnosis Date    ADHD (attention deficit hyperactivity disorder)     Contraception, device intrauterine 11/27/2018    Depression     Gestational diabetes  "mellitus (GDM)     well-controlled on metformin.       Past Surgical History:   Procedure Laterality Date     SECTION N/A 2023    Procedure:  SECTION;  Surgeon: Yulissa Westfall MD;  [facility]: [facility];  Service: OB/GYN;  Laterality: N/A;    KNEE ARTHROSCOPY      REDUCTION OF BOTH BREASTS  10/2021    Dr. Smith    STOMACH SURGERY  2016    gastric sleeve       Family History   Problem Relation Age of Onset    Breast cancer Neg Hx     Colon cancer Neg Hx     Ovarian cancer Neg Hx      Review of Systems:  ROS:  Constitutional: no fever or chills  Eyes: no visual changes  ENT: no nasal congestion or sore throat  Respiratory: no cough or shortness of breath  Cardiovascular: no chest pain or palpitations  Gastrointestinal: no nausea or vomiting, tolerating diet  Genitourinary: no hematuria or dysuria  Integument/Breast: no rash or pruritis  Hematologic/Lymphatic: no easy bruising or lymphadenopathy  Musculoskeletal:  left foot fracture  Neurological: no seizures or tremors  Behavioral/Psych: no auditory or visual hallucinations  Endocrine: no heat or cold intolerance      OBJECTIVE:     PHYSICAL EXAM:  Vital Signs (Most Recent)  There were no vitals filed for this visit.  Height: 5' 5" (165.1 cm) Weight: 83 kg (182 lb 15.7 oz),   Estimated body mass index is 30.45 kg/m² as calculated from the following:    Height as of this encounter: 5' 5" (1.651 m).    Weight as of this encounter: 83 kg (182 lb 15.7 oz).   General Appearance: Well nourished, well developed, in no acute distress.  HENT: Normal cephalic, oropharynx pink and moist  Eyes: PERRLA bilaterally and EOM x 4  Respiratory: Even and unlabored  Skin: Warm and Dry.   Psychiatric: AAO x 4, Mood & affect are normal.    left  Foot/Ankle    General appearance: no acute distress, alert/oriented x3, appropriate mood and affect, looks stated age, and well nourished  The examination was performed out of splint/cast  Skin: normal  Swelling:  " None  Warmth: no warmth  Tenderness:  None  ROM: normal  Strength: normal  Gait: Normal  Stability: stable to testing and Cotton test: negative  Crepitus: no  Neurological Exam: normal  Vascular Exam: normal and pulse present    normal exam, no swelling, tenderness, instability; ligaments intact, full range of motion of all ankle/foot joints      RADIOGRAPHS:  X-ray of the left foot shows nondisplaced fracture of the proximal metatarsals at 2, 3, and 4.  Fractures with progressive callus formation when compared to prior x-ray.  No new fractures seen.  All radiographs were personally reviewed by me.    ASSESSMENT/PLAN:       ICD-10-CM ICD-9-CM   1. Closed nondisplaced fracture of fourth metatarsal bone of left foot with routine healing, subsequent encounter  S92.345D V54.19   2. Closed nondisplaced fracture of third metatarsal bone of left foot with routine healing, subsequent encounter  S92.335D V54.19   3. Closed nondisplaced fracture of second metatarsal bone of left foot with routine healing, subsequent encounter  S92.325D V54.19       Plan:  -Lorena Parrish presents to clinic today with c/c left metatarsal fracture secondary to a tripping injury.  Date of injury September 30th 2023.  -X-ray as above.    I discussed the x-ray findings with the patient.  Appears her fracture healing.  She is back in regular shoes with a normal gait pattern.  At this point I will discharge the patient and she follow-up in the clinic p.r.n..

## 2023-12-18 ENCOUNTER — TELEPHONE (OUTPATIENT)
Dept: PHARMACY | Facility: CLINIC | Age: 28
End: 2023-12-18

## 2024-05-13 ENCOUNTER — TELEPHONE (OUTPATIENT)
Dept: PHARMACY | Facility: CLINIC | Age: 29
End: 2024-05-13

## 2025-02-26 ENCOUNTER — TELEPHONE (OUTPATIENT)
Dept: OBSTETRICS AND GYNECOLOGY | Facility: CLINIC | Age: 30
End: 2025-02-26
Payer: COMMERCIAL

## 2025-02-27 NOTE — TELEPHONE ENCOUNTER
Pt states she has had 3 periods in 1 month.  Has had IUD since 12/2023.  Usually has a light 4 day period monthly.  No vaginal complaints.  Recommended taking UPT if sexually active.  She recently lost a lot of weight, advised that could be the cause of the irregular bleeding.  Advised if its the first time she has had BTB can monitor to see if it continues.  Offered appt for reassurance.  Scheduled with Yanique.      LMP 1/20 light x3 days-this was on time for normal period but shorter than usual.    Bled again first week of February, light x4 days   Started bleeding mid February but was heavy x3-4 days which is not normal for her.     no

## 2025-03-12 ENCOUNTER — OFFICE VISIT (OUTPATIENT)
Dept: OBSTETRICS AND GYNECOLOGY | Facility: CLINIC | Age: 30
End: 2025-03-12
Payer: COMMERCIAL

## 2025-03-12 VITALS
DIASTOLIC BLOOD PRESSURE: 72 MMHG | WEIGHT: 144.63 LBS | SYSTOLIC BLOOD PRESSURE: 110 MMHG | BODY MASS INDEX: 24.07 KG/M2

## 2025-03-12 DIAGNOSIS — Z97.5 BREAKTHROUGH BLEEDING WITH IUD: Primary | ICD-10-CM

## 2025-03-12 DIAGNOSIS — N92.1 BREAKTHROUGH BLEEDING WITH IUD: Primary | ICD-10-CM

## 2025-03-12 PROCEDURE — 3074F SYST BP LT 130 MM HG: CPT | Mod: CPTII,S$GLB,, | Performed by: NURSE PRACTITIONER

## 2025-03-12 PROCEDURE — 99213 OFFICE O/P EST LOW 20 MIN: CPT | Mod: S$GLB,,, | Performed by: NURSE PRACTITIONER

## 2025-03-12 PROCEDURE — 99999 PR PBB SHADOW E&M-EST. PATIENT-LVL III: CPT | Mod: PBBFAC,,, | Performed by: NURSE PRACTITIONER

## 2025-03-12 PROCEDURE — 1160F RVW MEDS BY RX/DR IN RCRD: CPT | Mod: CPTII,S$GLB,, | Performed by: NURSE PRACTITIONER

## 2025-03-12 PROCEDURE — 3078F DIAST BP <80 MM HG: CPT | Mod: CPTII,S$GLB,, | Performed by: NURSE PRACTITIONER

## 2025-03-12 PROCEDURE — 1159F MED LIST DOCD IN RCRD: CPT | Mod: CPTII,S$GLB,, | Performed by: NURSE PRACTITIONER

## 2025-03-12 PROCEDURE — 3008F BODY MASS INDEX DOCD: CPT | Mod: CPTII,S$GLB,, | Performed by: NURSE PRACTITIONER

## 2025-03-12 RX ORDER — VILAZODONE HYDROCHLORIDE 20 MG/1
TABLET ORAL
COMMUNITY

## 2025-03-12 NOTE — PROGRESS NOTES
CC: IUD check    Pt is a 28 y/o female  presents for IUD check. Patient had a Mirena placed on 2023. She is not having problems with cramping, fever or discharge. She has not tried to feel the strings. She reports irregular bleeding for the past few weeks, usually regular menses since IUD placement.    GLP-1 for weight loss, reporting fatigue and hair loss. Low protein intake.       ROS:  GENERAL: Feeling well overall. Denies fever or chills.   SKIN: Denies rash or lesions.   HEAD: Denies head injury or headache.   NODES: Denies enlarged lymph nodes.   CHEST: Denies chest pain or shortness of breath.   CARDIOVASCULAR: Denies palpitations or left sided chest pain.   ABDOMEN: No abdominal pain, constipation, diarrhea, nausea, vomiting or rectal bleeding.   URINARY: No dysuria, hematuria, or burning on urination.  REPRODUCTIVE: See HPI.   BREASTS: Denies pain, lumps, or nipple discharge.   HEMATOLOGIC: No easy bruisability or excessive bleeding.   MUSCULOSKELETAL: Denies joint pain or swelling.   NEUROLOGIC: Denies syncope or weakness.   PSYCHIATRIC: Denies depression, anxiety or mood swings.      PHYSICAL EXAM:  Abdomen: Soft, non-tender, non-distended  Vulva: No lesions  Vaginal: No lesions, no abnormal discharge  Cervix: No discharge, no CMT, IUD strings visible at os (2 cm out)  Uterus: Normal size, non-tender  Adnexa: No masses, non-tender    ASSESSMENT AND PLAN:  1. IUD surveillance    Pelvic US for IUD check.  Discussed importance of high protein with GLP-1 and hydration.    Return for annual GYN exam       LIZETH Lea  
normal

## 2025-05-08 ENCOUNTER — OFFICE VISIT (OUTPATIENT)
Dept: OBSTETRICS AND GYNECOLOGY | Facility: CLINIC | Age: 30
End: 2025-05-08
Payer: COMMERCIAL

## 2025-05-08 VITALS
BODY MASS INDEX: 23.54 KG/M2 | DIASTOLIC BLOOD PRESSURE: 82 MMHG | SYSTOLIC BLOOD PRESSURE: 126 MMHG | HEIGHT: 65 IN | WEIGHT: 141.31 LBS

## 2025-05-08 DIAGNOSIS — Z12.4 SCREENING FOR CERVICAL CANCER: ICD-10-CM

## 2025-05-08 DIAGNOSIS — Z01.419 ENCOUNTER FOR GYNECOLOGICAL EXAMINATION: Primary | ICD-10-CM

## 2025-05-08 PROCEDURE — 99999 PR PBB SHADOW E&M-EST. PATIENT-LVL III: CPT | Mod: PBBFAC,,, | Performed by: OBSTETRICS & GYNECOLOGY

## 2025-05-08 RX ORDER — LISDEXAMFETAMINE DIMESYLATE 20 MG/1
CAPSULE ORAL
COMMUNITY

## 2025-05-08 NOTE — PROGRESS NOTES
"Subjective:       Patient ID: Lorena Parrish is a 29 y.o. female.    Chief Complaint:  Annual Exam (Last pap: 2023 Normal )      History of Present Illness  - here for annual. Only occasional spotting when wipes with Mirena. No complaints.    Past Medical History:   Diagnosis Date    ADHD (attention deficit hyperactivity disorder)     Contraception, device intrauterine 2023    Mirena    Depression     Gestational diabetes mellitus (GDM)     well-controlled on metformin.       Past Surgical History:   Procedure Laterality Date     SECTION N/A 2023    Procedure:  SECTION;  Surgeon: Yulissa Westfall MD;  Location: [facility] L&D;  Service: OB/GYN;  Laterality: N/A;    KNEE ARTHROSCOPY      REDUCTION OF BOTH BREASTS  10/2021    Dr. Smith    STOMACH SURGERY  2016    gastric sleeve        Family History   Problem Relation Name Age of Onset    Breast cancer Neg Hx      Colon cancer Neg Hx      Ovarian cancer Neg Hx          Social History[1]        Objective:     Vitals:    25 1431   BP: 126/82   Patient Position: Sitting   Weight: 64.1 kg (141 lb 5 oz)   Height: 5' 5" (1.651 m)       Physical Exam:   Constitutional: She is oriented to person, place, and time. She appears well-developed and well-nourished.        Pulmonary/Chest: Right breast exhibits no mass, no nipple discharge, no skin change, no tenderness and no swelling. Left breast exhibits no mass, no nipple discharge, no skin change, no tenderness and no swelling. Breasts are symmetrical.        Abdominal: Soft. She exhibits no distension. There is no abdominal tenderness.     Genitourinary:    Vagina and uterus normal.   There is no tenderness or lesion on the right labia. There is no tenderness or lesion on the left labia. Cervix is normal. Right adnexum displays no mass, no tenderness and no fullness. Left adnexum displays no mass, no tenderness and no fullness. No vaginal discharge in the vagina.    pap smear " "completedIUD strings visualized.          Musculoskeletal: Moves all extremeties.       Neurological: She is alert and oriented to person, place, and time.     Psychiatric: She has a normal mood and affect.        A female chaperone was present for exam.    Assessment/ Plan:     Orders Placed This Encounter    Liquid-Based Pap Smear, Screening       Lorena Serna" was seen today for annual exam.    Diagnoses and all orders for this visit:    Encounter for gynecological examination    Screening for cervical cancer  -     Liquid-Based Pap Smear, Screening        Follow up in about 1 year (around 5/8/2026) for annual exam.    As of April 1, 2021, the Cures Act has been passed nationally. This new law requires that all doctors progress notes, lab results, pathology reports and radiology reports be released IMMEDIATELY to the patient in the patient portal. That means that the results are released to you at the EXACT same time they are released to me. Therefore, with all of the patients that I have I am not able to reply to each patient exactly when the results come in. So there will be a delay from when you see the results to when I see them and have time to come up with a response to send you. Also I only see these results when I am on the computer at work. So if the results come in over the weekend or after 5 pm of a work day, I will not see them until the next business day. As you can tell, this is a challenge as a physician to give every patient the quick response they hope for and deserve. So please be patient!   Thanks for your understanding and patience.         [1]   Social History  Socioeconomic History    Marital status:    Tobacco Use    Smoking status: Never    Smokeless tobacco: Never   Substance and Sexual Activity    Alcohol use: Yes     Comment: social    Drug use: No    Sexual activity: Yes     Birth control/protection: I.U.D.     Comment: Single:   Mirena  12-4-23     Social Drivers of Health "     Financial Resource Strain: Low Risk  (10/4/2022)    Received from [facility]    Overall Financial Resource Strain (CARDIA)     Difficulty of Paying Living Expenses: Not hard at all   Food Insecurity: No Food Insecurity (10/4/2022)    Received from [facility]    Hunger Vital Sign     Worried About Running Out of Food in the Last Year: Never true     Ran Out of Food in the Last Year: Never true   Transportation Needs: No Transportation Needs (10/4/2022)    Received from [facility]    PRAPARE - Transportation     Lack of Transportation (Medical): No     Lack of Transportation (Non-Medical): No

## 2025-05-15 ENCOUNTER — RESULTS FOLLOW-UP (OUTPATIENT)
Dept: OBSTETRICS AND GYNECOLOGY | Facility: CLINIC | Age: 30
End: 2025-05-15